# Patient Record
Sex: FEMALE | Race: WHITE | NOT HISPANIC OR LATINO | Employment: FULL TIME | URBAN - METROPOLITAN AREA
[De-identification: names, ages, dates, MRNs, and addresses within clinical notes are randomized per-mention and may not be internally consistent; named-entity substitution may affect disease eponyms.]

---

## 2017-02-17 ENCOUNTER — OFFICE VISIT (OUTPATIENT)
Dept: INTERNAL MEDICINE | Facility: CLINIC | Age: 52
End: 2017-02-17

## 2017-02-17 VITALS
TEMPERATURE: 98.5 F | HEIGHT: 71 IN | DIASTOLIC BLOOD PRESSURE: 72 MMHG | OXYGEN SATURATION: 98 % | SYSTOLIC BLOOD PRESSURE: 118 MMHG | HEART RATE: 62 BPM

## 2017-02-17 DIAGNOSIS — L25.9 CONTACT DERMATITIS, UNSPECIFIED CONTACT DERMATITIS TYPE, UNSPECIFIED TRIGGER: ICD-10-CM
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
## 2017-02-17 PROCEDURE — 99213 OFFICE O/P EST LOW 20 MIN: CPT | Performed by: NURSE PRACTITIONER

## 2017-02-17 RX ORDER — MULTIVITAMIN
1 TABLET ORAL DAILY
COMMUNITY
Start: 2013-05-10

## 2017-02-17 RX ORDER — SULFAMETHOXAZOLE AND TRIMETHOPRIM 800; 160 MG/1; MG/1
1 TABLET ORAL 2 TIMES DAILY
Qty: 10 TABLET | Refills: 0 | Status: SHIPPED | OUTPATIENT
Start: 2017-02-17 | End: 2017-02-22

## 2017-02-17 RX ORDER — METHYLPREDNISOLONE 4 MG/1
TABLET ORAL
Qty: 21 TABLET | Refills: 0 | Status: SHIPPED | OUTPATIENT
Start: 2017-02-17 | End: 2017-02-22

## 2017-02-17 NOTE — PROGRESS NOTES
Subjective   Magali Schrader is a 52 y.o. female who presents due to a rash.    HPI Comments: She was in [place] earlier this week when she noticed what she things is an insect sting on the left chin. She states the area has been pruritic but not painful, she c/o similar lesions throughout arms since then. She states the lesions have had some clear drainage. Denies fever/chills.    Rash   This is a new problem. The current episode started in the past 7 days. The problem has been gradually worsening since onset. The affected locations include the left arm, right arm, face and neck. The rash is characterized by itchiness and redness. She was exposed to an insect bite/sting. Pertinent negatives include no congestion, cough, facial edema, fatigue, fever, shortness of breath, sore throat or vomiting. Past treatments include anti-itch cream. The treatment provided no relief. There is no history of allergies.        The following portions of the patient's history were reviewed and updated as appropriate: allergies, current medications, past social history and problem list.    Past Medical History   Diagnosis Date   • Hyperlipidemia    • Sinusitis          Current Outpatient Prescriptions:   •  Multiple Vitamin (MULTI-DAY VITAMINS) tablet, Take  by mouth Daily., Disp: , Rfl:   •  hydrocortisone (ANUSOL-HC) 2.5 % rectal cream, Insert  into the rectum 2 (Two) Times a Day., Disp: 28.35 g, Rfl: 1  •  MethylPREDNISolone (MEDROL) 4 MG tablet, follow package directions, Disp: 21 tablet, Rfl: 0  •  sulfamethoxazole-trimethoprim (BACTRIM DS,SEPTRA DS) 800-160 MG per tablet, Take 1 tablet by mouth 2 (Two) Times a Day for 5 days., Disp: 10 tablet, Rfl: 0    No Known Allergies    Review of Systems   Constitutional: Negative for chills, fatigue, fever and unexpected weight change.   HENT: Negative for congestion, ear pain, postnasal drip, sinus pressure, sore throat and trouble swallowing.    Eyes: Negative for visual disturbance.  "  Respiratory: Negative for cough, chest tightness, shortness of breath and wheezing.    Cardiovascular: Negative for chest pain, palpitations and leg swelling.   Gastrointestinal: Negative for abdominal pain, blood in stool, nausea and vomiting.   Endocrine: Negative for cold intolerance and heat intolerance.   Genitourinary: Negative for dysuria, frequency and urgency.   Musculoskeletal: Negative for arthralgias and joint swelling.   Skin: Positive for rash. Negative for color change.   Allergic/Immunologic: Negative for immunocompromised state.   Neurological: Negative for syncope, weakness and headaches.   Hematological: Does not bruise/bleed easily.       Objective   Vitals:    02/17/17 0834   BP: 118/72   BP Location: Left arm   Patient Position: Sitting   Cuff Size: Adult   Pulse: 62   Temp: 98.5 °F (36.9 °C)   SpO2: 98%   Height: 71\" (180.3 cm)     Physical Exam   Constitutional: She is oriented to person, place, and time. She appears well-developed and well-nourished. No distress.   HENT:   Head: Normocephalic and atraumatic.   Right Ear: External ear normal.   Left Ear: External ear normal.   Eyes: Conjunctivae and EOM are normal. Pupils are equal, round, and reactive to light. No scleral icterus.   Neck: Normal range of motion. Neck supple. No thyromegaly present.   Cardiovascular: Normal rate, regular rhythm, normal heart sounds and intact distal pulses.  Exam reveals no gallop and no friction rub.    No murmur heard.  Pulmonary/Chest: Effort normal and breath sounds normal. No respiratory distress.   Lymphadenopathy:     She has no cervical adenopathy.   Neurological: She is alert and oriented to person, place, and time.   Skin: Skin is warm and dry. Rash noted. Rash is papular. No erythema.   Scattered papules with surrounding erythema on bilateral arms and anterior neck. She also has 3 lesions on her left chin and malar area.   Psychiatric: She has a normal mood and affect. Her behavior is normal. "   Nursing note and vitals reviewed.      Assessment/Plan   Magali was seen today for insect bite.    Diagnoses and all orders for this visit:    Insect bite, initial encounter  Comments:  will treat w/ Bactrim due to surrounding erythema & drainage, treat aggressively and prevent staph infection    Contact dermatitis, unspecified contact dermatitis type, unspecified trigger  -     sulfamethoxazole-trimethoprim (BACTRIM DS,SEPTRA DS) 800-160 MG per tablet; Take 1 tablet by mouth 2 (Two) Times a Day for 5 days.  -     MethylPREDNISolone (MEDROL) 4 MG tablet; follow package directions  -     hydrocortisone (ANUSOL-HC) 2.5 % rectal cream; Insert  into the rectum 2 (Two) Times a Day.    Advised to avoid Zyrtec and/or Benadryl while flying.

## 2017-02-22 ENCOUNTER — OFFICE VISIT (OUTPATIENT)
Dept: INTERNAL MEDICINE | Facility: CLINIC | Age: 52
End: 2017-02-22

## 2017-02-22 ENCOUNTER — APPOINTMENT (OUTPATIENT)
Dept: WOMENS IMAGING | Facility: HOSPITAL | Age: 52
End: 2017-02-22

## 2017-02-22 VITALS
HEIGHT: 71 IN | HEART RATE: 83 BPM | WEIGHT: 162 LBS | SYSTOLIC BLOOD PRESSURE: 118 MMHG | OXYGEN SATURATION: 97 % | BODY MASS INDEX: 22.68 KG/M2 | DIASTOLIC BLOOD PRESSURE: 74 MMHG

## 2017-02-22 DIAGNOSIS — Z11.59 SCREENING FOR VIRAL DISEASE: ICD-10-CM

## 2017-02-22 DIAGNOSIS — M25.561 ACUTE PAIN OF RIGHT KNEE: ICD-10-CM

## 2017-02-22 DIAGNOSIS — Z12.11 SCREENING FOR COLON CANCER: ICD-10-CM

## 2017-02-22 DIAGNOSIS — Z12.39 SCREENING FOR BREAST CANCER: ICD-10-CM

## 2017-02-22 DIAGNOSIS — Z00.00 PE (PHYSICAL EXAM), ANNUAL: Primary | ICD-10-CM

## 2017-02-22 LAB
ALBUMIN SERPL-MCNC: 4.28 G/DL (ref 3.4–4.6)
ALBUMIN/GLOB SERPL: 1.3 G/DL
ALP SERPL-CCNC: 55 U/L (ref 46–116)
ALT SERPL W P-5'-P-CCNC: 30 U/L (ref 14–59)
ANION GAP SERPL CALCULATED.3IONS-SCNC: 8 MMOL/L
AST SERPL-CCNC: 21 U/L (ref 7–37)
BASOPHILS # BLD AUTO: 0.06 10*3/MM3 (ref 0–0.2)
BASOPHILS NFR BLD AUTO: 1.1 % (ref 0–2)
BILIRUB SERPL-MCNC: 0.3 MG/DL (ref 0.2–1)
BILIRUB UR QL STRIP: NEGATIVE
BUN BLD-MCNC: 15 MG/DL (ref 6–22)
BUN/CREAT SERPL: 16 (ref 7–25)
CALCIUM SPEC-SCNC: 9.1 MG/DL (ref 8.6–10.5)
CHLORIDE SERPL-SCNC: 100 MMOL/L (ref 95–107)
CHOLEST SERPL-MCNC: 237 MG/DL (ref 0–200)
CLARITY UR: CLEAR
CO2 SERPL-SCNC: 30 MMOL/L (ref 23–32)
COLOR UR: YELLOW
CREAT BLD-MCNC: 0.94 MG/DL (ref 0.55–1.02)
DEPRECATED RDW RBC AUTO: 43.2 FL (ref 37–54)
EOSINOPHIL # BLD AUTO: 0.19 10*3/MM3 (ref 0–0.7)
EOSINOPHIL NFR BLD AUTO: 3.5 % (ref 0–5)
ERYTHROCYTE [DISTWIDTH] IN BLOOD BY AUTOMATED COUNT: 11.9 % (ref 11.5–15)
GFR SERPL CREATININE-BSD FRML MDRD: 63 ML/MIN/1.73
GLOBULIN UR ELPH-MCNC: 3.2 GM/DL
GLUCOSE BLD-MCNC: 95 MG/DL (ref 70–100)
GLUCOSE UR STRIP-MCNC: NEGATIVE MG/DL
HCT VFR BLD AUTO: 45 % (ref 34.1–44.9)
HDLC SERPL-MCNC: 104 MG/DL (ref 40–81)
HGB BLD-MCNC: 14.8 G/DL (ref 11.2–15.7)
HGB UR QL STRIP.AUTO: NEGATIVE
KETONES UR QL STRIP: NEGATIVE
LDLC SERPL CALC-MCNC: 118 MG/DL (ref 0–100)
LDLC/HDLC SERPL: 1.13 {RATIO}
LEUKOCYTE ESTERASE UR QL STRIP.AUTO: NEGATIVE
LYMPHOCYTES # BLD AUTO: 0.93 10*3/MM3 (ref 0.8–7)
LYMPHOCYTES NFR BLD AUTO: 17.3 % (ref 10–60)
MCH RBC QN AUTO: 32.7 PG (ref 26–34)
MCHC RBC AUTO-ENTMCNC: 32.9 G/DL (ref 31–37)
MCV RBC AUTO: 99.3 FL (ref 80–100)
MONOCYTES # BLD AUTO: 1.26 10*3/MM3 (ref 0–1)
MONOCYTES NFR BLD AUTO: 23.4 % (ref 0–13)
NEUTROPHILS # BLD AUTO: 2.95 10*3/MM3 (ref 1–11)
NEUTROPHILS NFR BLD AUTO: 54.7 % (ref 30–85)
NITRITE UR QL STRIP: NEGATIVE
PH UR STRIP.AUTO: 7 [PH] (ref 5–8)
PLAT MORPH BLD: NORMAL
PLATELET # BLD AUTO: 297 10*3/MM3 (ref 150–450)
PMV BLD AUTO: 9.3 FL (ref 6–12)
POTASSIUM BLD-SCNC: 5.2 MMOL/L (ref 3.3–5.3)
PROT SERPL-MCNC: 7.5 G/DL (ref 6.3–8.4)
PROT UR QL STRIP: NEGATIVE
RBC # BLD AUTO: 4.53 10*6/MM3 (ref 3.93–5.22)
RBC MORPH BLD: NORMAL
SODIUM BLD-SCNC: 138 MMOL/L (ref 136–145)
SP GR UR STRIP: 1.01 (ref 1–1.03)
TRIGL SERPL-MCNC: 76 MG/DL (ref 0–150)
TSH SERPL DL<=0.05 MIU/L-ACNC: 1.47 MIU/ML (ref 0.4–4.2)
UROBILINOGEN UR QL STRIP: NORMAL
VLDLC SERPL-MCNC: 15.2 MG/DL
WBC MORPH BLD: NORMAL
WBC NRBC COR # BLD: 5.39 10*3/MM3 (ref 5–10)

## 2017-02-22 PROCEDURE — 80061 LIPID PANEL: CPT | Performed by: NURSE PRACTITIONER

## 2017-02-22 PROCEDURE — 84443 ASSAY THYROID STIM HORMONE: CPT | Performed by: NURSE PRACTITIONER

## 2017-02-22 PROCEDURE — 80053 COMPREHEN METABOLIC PANEL: CPT | Performed by: NURSE PRACTITIONER

## 2017-02-22 PROCEDURE — 36415 COLL VENOUS BLD VENIPUNCTURE: CPT | Performed by: NURSE PRACTITIONER

## 2017-02-22 PROCEDURE — 81003 URINALYSIS AUTO W/O SCOPE: CPT | Performed by: NURSE PRACTITIONER

## 2017-02-22 PROCEDURE — 99396 PREV VISIT EST AGE 40-64: CPT | Performed by: NURSE PRACTITIONER

## 2017-02-22 PROCEDURE — 85025 COMPLETE CBC W/AUTO DIFF WBC: CPT | Performed by: NURSE PRACTITIONER

## 2017-02-23 ENCOUNTER — OFFICE VISIT (OUTPATIENT)
Dept: INTERNAL MEDICINE | Facility: CLINIC | Age: 52
End: 2017-02-23

## 2017-02-23 ENCOUNTER — APPOINTMENT (OUTPATIENT)
Dept: WOMENS IMAGING | Facility: HOSPITAL | Age: 52
End: 2017-02-23

## 2017-02-23 DIAGNOSIS — Z12.39 SCREENING FOR BREAST CANCER: ICD-10-CM

## 2017-02-23 PROCEDURE — 77067 SCR MAMMO BI INCL CAD: CPT | Performed by: NURSE PRACTITIONER

## 2017-02-23 PROCEDURE — 77067 SCR MAMMO BI INCL CAD: CPT | Performed by: RADIOLOGY

## 2017-02-24 NOTE — PROGRESS NOTES
Subjective   Magali Schrader is a 52 y.o. female who presents for a physical exam.    HPI Comments: She c/o medial pain in the right knee since October, denies acute injury but does long distance runs and marathons. Sx originally began after a long run and improved somewhat with rest and with physical therapy. However, she c/o worsening pain after running 12 miles last Friday. She denies numbness/tingling in lower extremities.  Her rash from last week is rapidly resolving, denies itching or irritation from lesions.    Knee Pain    There was no injury mechanism (although sx began after a long run). The pain is present in the right knee. The quality of the pain is described as aching. The pain is moderate. The pain has been intermittent since onset. Pertinent negatives include no inability to bear weight, numbness or tingling. The symptoms are aggravated by weight bearing. She has tried ice and rest for the symptoms. The treatment provided mild relief.        The following portions of the patient's history were reviewed and updated as appropriate: allergies, current medications, past social history and problem list.    Past Medical History   Diagnosis Date   • Hyperlipidemia    • Sinusitis          Current Outpatient Prescriptions:   •  Multiple Vitamin (MULTI-DAY VITAMINS) tablet, Take  by mouth Daily., Disp: , Rfl:     No Known Allergies    Review of Systems   Constitutional: Negative for activity change, appetite change, chills, diaphoresis, fatigue, fever and unexpected weight change.   HENT: Negative for congestion, dental problem, drooling, ear discharge, ear pain, facial swelling, hearing loss, mouth sores, nosebleeds, postnasal drip, rhinorrhea, sinus pressure, sore throat, tinnitus and trouble swallowing.         Wears eye glasses, last eye exam <1 year   Eyes: Negative for photophobia, pain, discharge, redness, itching and visual disturbance.   Respiratory: Negative for apnea, cough, choking, chest  "tightness, shortness of breath and wheezing.    Cardiovascular: Negative for chest pain, palpitations and leg swelling.        No orthopnea, PND, BOTELLO   Gastrointestinal: Negative for abdominal pain, blood in stool, constipation, diarrhea, nausea and vomiting.   Endocrine: Negative for cold intolerance, heat intolerance, polydipsia and polyuria.   Genitourinary: Negative for decreased urine volume, dysuria, enuresis, flank pain, frequency, hematuria and urgency.   Musculoskeletal: Positive for arthralgias. Negative for back pain, gait problem, joint swelling, myalgias, neck pain and neck stiffness.   Skin: Positive for rash. Negative for color change.        No hair changes, no nail changes   Allergic/Immunologic: Negative for environmental allergies, food allergies and immunocompromised state.   Neurological: Negative for dizziness, tingling, tremors, seizures, syncope, speech difficulty, weakness, light-headedness, numbness and headaches.   Hematological: Negative for adenopathy. Does not bruise/bleed easily.   Psychiatric/Behavioral: Negative for agitation, confusion, decreased concentration, dysphoric mood, sleep disturbance and suicidal ideas. The patient is not nervous/anxious.        Objective   Vitals:    02/22/17 0814   BP: 118/74   BP Location: Left arm   Patient Position: Sitting   Cuff Size: Adult   Pulse: 83   SpO2: 97%   Weight: 162 lb (73.5 kg)   Height: 71\" (180.3 cm)     Physical Exam   Constitutional: She is oriented to person, place, and time. She appears well-developed and well-nourished. No distress.   HENT:   Right Ear: Hearing, tympanic membrane, external ear and ear canal normal.   Left Ear: Hearing, tympanic membrane, external ear and ear canal normal.   Nose: Right sinus exhibits no maxillary sinus tenderness and no frontal sinus tenderness. Left sinus exhibits no maxillary sinus tenderness and no frontal sinus tenderness.   Eyes: Conjunctivae, EOM and lids are normal. Pupils are equal, " round, and reactive to light.   Neck: Trachea normal and phonation normal. Neck supple. Normal carotid pulses and no JVD present. Carotid bruit is not present. No thyroid mass and no thyromegaly present.   Cardiovascular: Normal rate, regular rhythm, S1 normal and S2 normal.  PMI is not displaced.  Exam reveals no gallop and no friction rub.    No murmur heard.  Pulses:       Carotid pulses are 2+ on the right side, and 2+ on the left side.       Radial pulses are 2+ on the right side, and 2+ on the left side.        Dorsalis pedis pulses are 2+ on the right side, and 2+ on the left side.   Pulmonary/Chest: Effort normal and breath sounds normal. She has no wheezes. She has no rhonchi. She has no rales. Chest wall is not dull to percussion.   Abdominal: Soft. Normal appearance, normal aorta and bowel sounds are normal. She exhibits no abdominal bruit and no mass. There is no hepatosplenomegaly. There is no tenderness.   Musculoskeletal: Normal range of motion. She exhibits no edema.        Right knee: She exhibits normal range of motion and no swelling. Tenderness found. Medial joint line tenderness noted.   Lymphadenopathy:     She has no cervical adenopathy.     She has no axillary adenopathy.        Left: No supraclavicular adenopathy present.   Neurological: She is alert and oriented to person, place, and time. She has normal strength and normal reflexes. No cranial nerve deficit or sensory deficit. Coordination normal.   Skin: Skin is warm and dry. No rash noted.   Macules on bilateral forearms without erythema   Psychiatric: She has a normal mood and affect. Her speech is normal and behavior is normal. Judgment and thought content normal. Cognition and memory are normal. She is attentive.   Nursing note and vitals reviewed.      Assessment/Plan   Magali was seen today for annual exam and knee pain.    Diagnoses and all orders for this visit:    PE (physical exam), annual  -     CBC Auto Differential;  Future  -     Comprehensive Metabolic Panel; Future  -     Lipid Panel; Future  -     TSH; Future  -     Urinalysis With / Microscopic If Indicated; Future  -     CBC Auto Differential  -     Comprehensive Metabolic Panel  -     Lipid Panel  -     TSH  -     Urinalysis With / Microscopic If Indicated  -     Scan Slide; Future  -     Scan Slide    Screening for colon cancer  Comments:  has not had colonoscopy which she will schedule  Orders:  -     Ambulatory Referral to General Surgery    Screening for breast cancer  Comments:  overdue for mammogram which she will schedule  Orders:  -     Mammo Screening Bilateral With CAD; Future    Acute pain of right knee  Comments:  due to lingering sx will refer to ortho for further eval, discussed resting and beginning NSAID for inflammation  Orders:  -     Ambulatory Referral to Orthopedic Surgery    Screening for viral disease

## 2017-02-25 LAB — HCV AB S/CO SERPL IA: <0.1 S/CO RATIO (ref 0–0.9)

## 2017-03-13 DIAGNOSIS — L25.9 CONTACT DERMATITIS, UNSPECIFIED CONTACT DERMATITIS TYPE, UNSPECIFIED TRIGGER: ICD-10-CM

## 2017-03-16 ENCOUNTER — TELEPHONE (OUTPATIENT)
Dept: INTERNAL MEDICINE | Facility: CLINIC | Age: 52
End: 2017-03-16

## 2017-03-16 ENCOUNTER — OFFICE VISIT (OUTPATIENT)
Dept: ORTHOPEDIC SURGERY | Facility: CLINIC | Age: 52
End: 2017-03-16

## 2017-03-16 VITALS — WEIGHT: 164 LBS | BODY MASS INDEX: 22.21 KG/M2 | HEIGHT: 72 IN

## 2017-03-16 DIAGNOSIS — G89.29 CHRONIC PAIN OF RIGHT KNEE: Primary | ICD-10-CM

## 2017-03-16 DIAGNOSIS — S83.241A OTHER TEAR OF MEDIAL MENISCUS OF RIGHT KNEE AS CURRENT INJURY, INITIAL ENCOUNTER: ICD-10-CM

## 2017-03-16 DIAGNOSIS — M25.561 CHRONIC PAIN OF RIGHT KNEE: Primary | ICD-10-CM

## 2017-03-16 PROCEDURE — 99203 OFFICE O/P NEW LOW 30 MIN: CPT | Performed by: ORTHOPAEDIC SURGERY

## 2017-03-16 PROCEDURE — 73562 X-RAY EXAM OF KNEE 3: CPT | Performed by: ORTHOPAEDIC SURGERY

## 2017-03-16 RX ORDER — SULFAMETHOXAZOLE AND TRIMETHOPRIM 800; 160 MG/1; MG/1
TABLET ORAL
Qty: 10 TABLET | Refills: 0 | OUTPATIENT
Start: 2017-03-16

## 2017-03-16 NOTE — PROGRESS NOTES
New Right Knee      Patient: Magali Shcrader        YOB: 1965    Medical Record Number: [id number]        Chief Complaints: Right Knee Pain  Chief Complaint   Patient presents with   • Right Knee - Establish Care           History of Present Illness: This is a  52 y.o. female who presents complaining of right knee pain.  It's been ongoing since December no history injury change in activity.  I saw her 3-4 years ago for completely another issue.  She is an avid runner and was vomiting of her mileage a little bit and started having pain and then swelling.  Her swelling is been significant she's been off since November still has persistent swelling she's tried anti-inflammatories rest with no improvement or symptoms.  Again she has swelling her symptoms are described as moderate constant burning swelling pain with sitting walking and running.  She is a polyp her past medical history is unremarkable    Allergies: No Known Allergies    Medications:   Home Medications:  Current Outpatient Prescriptions on File Prior to Visit   Medication Sig   • Multiple Vitamin (MULTI-DAY VITAMINS) tablet Take  by mouth Daily.     No current facility-administered medications on file prior to visit.      Current Medications:  Scheduled Meds:  Continuous Infusions:  No current facility-administered medications for this visit.   PRN Meds:.    Past Medical History   Diagnosis Date   • Hyperlipidemia    • Sinusitis         Past Surgical History   Procedure Laterality Date   •  section  2003        Social History     Occupational History   • Not on file.     Social History Main Topics   • Smoking status: Never Smoker   • Smokeless tobacco: Not on file   • Alcohol use Yes      Comment: 3 x weekly   • Drug use: No   • Sexual activity: Not on file    Social History     Social History Narrative        Family History   Problem Relation Age of Onset   • Alzheimer's disease Mother              Review of Systems: 14 point  "review of systems are remarkable for pertinent positives listed in the chart by the patient the remainder are negative    Review of Systems      Physical Exam: 52 y.o. female  General Appearance:    Alert, cooperative, in no acute distress                 Vitals:    03/16/17 1321   Weight: 164 lb (74.4 kg)   Height: 73\" (185.4 cm)      Patient is alert and read ×3 no acute distress appears her above-listed at height weight and age.  Affect is normal respiratory rate is normal unlabored. Heart rate regular rate rhythm, sclera, dentition and hearing are normal for the purpose of this exam.        Ortho Exam Physical exam of the right  knee reveals moderate effusion no redness.  The patient does have tenderness about the medial l joint line.  No tenderness about the lateral l joint line.  A negative bounce home and a positive l medial Alexandra.    Patient has a stable ligamentous exam.  The patient has a negative Lachman and negative anterior drawer and a negative pivot shift.  Quads are reasonable and symmetric bilaterally.  Calf is soft and nontender.  There is no overlying skin changes no lymphedema lymphadenopathy.  Patient has good hip range of motion full symmetric and asymptomatic and a normal ankle exam.  She has good distal pulses and sensation distally is intact        Procedures             Radiology:   AP, Lateral and merchant views of the right knee  were ordered/reviewed to evauateknee pain.  I've no comparative films.  These are essentially normal may be mild patellofemoral away otherwise no acute bony pathology is seen      Assessment/Plan:  Right knee pain I'm quite confident this lady has a medial meniscus tear.  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  Lantus to proceed with an MRI and have her call me after that test.                                  "

## 2017-03-16 NOTE — TELEPHONE ENCOUNTER
----- Message from Viry Cantu sent at 3/16/2017  8:14 AM EDT -----  Contact: pt - Dr Pugh' pt - RE: Rx refill  Pt returning call informing pt did not ask for refill on Rx. Pt informs that credit cards were compromised last month. Pt not sure if this had something do to with that. Pt assures did not call pharmacy for refill. Pt informs does not need refill on Rx. Could you please cancel order. Please advise. Thanks      Pt #[phone]

## 2017-03-21 ENCOUNTER — OFFICE VISIT (OUTPATIENT)
Dept: ORTHOPEDIC SURGERY | Facility: CLINIC | Age: 52
End: 2017-03-21

## 2017-03-21 DIAGNOSIS — S83.241A OTHER TEAR OF MEDIAL MENISCUS OF RIGHT KNEE AS CURRENT INJURY, INITIAL ENCOUNTER: ICD-10-CM

## 2017-03-21 DIAGNOSIS — G89.29 CHRONIC PAIN OF RIGHT KNEE: ICD-10-CM

## 2017-03-21 DIAGNOSIS — M25.561 CHRONIC PAIN OF RIGHT KNEE: ICD-10-CM

## 2017-03-21 PROCEDURE — 73721 MRI JNT OF LWR EXTRE W/O DYE: CPT | Performed by: ORTHOPAEDIC SURGERY

## 2017-03-24 ENCOUNTER — TELEPHONE (OUTPATIENT)
Dept: ORTHOPEDIC SURGERY | Facility: CLINIC | Age: 52
End: 2017-03-24

## 2017-03-24 DIAGNOSIS — S83.241D OTHER TEAR OF MEDIAL MENISCUS OF RIGHT KNEE AS CURRENT INJURY, SUBSEQUENT ENCOUNTER: Primary | ICD-10-CM

## 2017-03-24 NOTE — TELEPHONE ENCOUNTER
Called patient and let her know the results of her MRI per [facility]. (Patient has a Medial Meniscus Tear and [facility] states she would need a scope done)  MRI paper has been scanned into

## 2017-04-13 ENCOUNTER — OFFICE VISIT (OUTPATIENT)
Dept: ORTHOPEDIC SURGERY | Facility: CLINIC | Age: 52
End: 2017-04-13

## 2017-04-13 VITALS — HEIGHT: 72 IN | TEMPERATURE: 98.3 F | BODY MASS INDEX: 21.81 KG/M2 | WEIGHT: 161 LBS

## 2017-04-13 DIAGNOSIS — S83.241D OTHER TEAR OF MEDIAL MENISCUS OF RIGHT KNEE AS CURRENT INJURY, SUBSEQUENT ENCOUNTER: Primary | ICD-10-CM

## 2017-04-13 PROCEDURE — 99213 OFFICE O/P EST LOW 20 MIN: CPT | Performed by: ORTHOPAEDIC SURGERY

## 2017-04-13 NOTE — PROGRESS NOTES
History & Physical       Patient: Magali Schrader    Date of Admission: No admission date for patient encounter.    YOB: 1965    Medical Record Number: [id number]    Attending Physician: No att. providers found        Chief Complaints: There are no admission diagnoses documented for this encoun*   Chief Complaint   Patient presents with   • Right Knee - Follow-up, Pain, Edema           History of Present Illness: This patient was here for preop discussion of her right knee arthroscopy however she states her knee is feeling better she wants to prove that she really needs it.  Had a very long discussion with she and her  to the tune of about 40 minutes with very good questions from both of them regarding the meniscus itself the actual geometry of the tear I did review the MRI with him and discussed the natural history meniscus tears.  She states it is not hurting her now.  She is can I get back to her activities if she has return of her swelling her pain she will call me we will get her back on the schedule.  I'm fairly confident that this will happen so I did go on and give her a good idea on perioperative regimen what to expect wrist benefits alternatives etc.     Allergies: No Known Allergies    Medications:   Home Medications:  Current Outpatient Prescriptions on File Prior to Visit   Medication Sig   • Multiple Vitamin (MULTI-DAY VITAMINS) tablet Take  by mouth Daily.     No current facility-administered medications on file prior to visit.      Current Medications:  Scheduled Meds:  Continuous Infusions:  No current facility-administered medications for this visit.   PRN Meds:.    Past Medical History:   Diagnosis Date   • Hyperlipidemia    • Sinusitis         Past Surgical History:   Procedure Laterality Date   •  SECTION  2003        Social History     Occupational History   • Not on file.     Social History Main Topics   • Smoking status: Never Smoker   • Smokeless  "tobacco: Not on file   • Alcohol use Yes      Comment: 3 x weekly   • Drug use: No   • Sexual activity: Defer    Social History     Social History Narrative        Family History   Problem Relation Age of Onset   • Alzheimer's disease Mother        Review of Systems      Physical Exam: 52 y.o. female  General Appearance:    Alert, cooperative, in no acute distress                    Vitals:    04/13/17 0842   Temp: 98.3 °F (36.8 °C)   Weight: 161 lb (73 kg)   Height: 73\" (185.4 cm)        Head:    Normocephalic, without obvious abnormality, atraumatic   Eyes:            conjunctivae and sclerae normal, no pallor, corneas clear,    Ears:    Ears appear intact with no abnormalities noted   Throat:   No oral lesions, no thrush, oral mucosa moist   Neck:   No adenopathy, supple, trachea midline, no thyromegaly,    Back:     No kyphosis present, no scoliosis present, no skin lesions,      erythema or scars, no tenderness to percussion or                   palpation,   range of motion normal   Lungs:     Clear to auscultation,respirations regular, even and                  unlabored    Heart:    Regular rhythm and normal rate               Chest Wall:    No abnormalities observed   Abdomen:     Normal bowel sounds, no masses, no organomegaly, soft        non-tender, non-distended, no guarding, no rebound                tenderness   Rectal:     Deferred   Extremities:    Moves all extremities well, no edema,   no cyanosis, no redness   Pulses:   Pulses palpable and equal bilaterally   Skin:   No bleeding, bruising or rash   Lymph nodes:   No palpable adenopathy   Neurologic:   Appears neurologic intact             Assessment:  Patient Active Problem List   Diagnosis   • Hyperlipidemia           Plan: All risks, benefits and alternatives were discussed.  Risks including to but not exclusive to anesthetic complications, including death, MI, CVA, infection, bleeding DVT, PE,  fracture, residual pain and need for future " surgery.  Patient understood all and agrees to proceed.  Again, she wants to hold off on this now she's can do a little more running and see if her symptoms return if they do we will get her back on the schedule for knee arthroscopy

## 2017-05-05 ENCOUNTER — APPOINTMENT (OUTPATIENT)
Dept: PREADMISSION TESTING | Facility: HOSPITAL | Age: 52
End: 2017-05-05

## 2017-05-05 VITALS
SYSTOLIC BLOOD PRESSURE: 116 MMHG | HEART RATE: 45 BPM | OXYGEN SATURATION: 100 % | HEIGHT: 72 IN | WEIGHT: 155 LBS | BODY MASS INDEX: 20.99 KG/M2 | TEMPERATURE: 98.4 F | DIASTOLIC BLOOD PRESSURE: 73 MMHG

## 2017-05-05 LAB
DEPRECATED RDW RBC AUTO: 47.1 FL (ref 37–54)
ERYTHROCYTE [DISTWIDTH] IN BLOOD BY AUTOMATED COUNT: 13 % (ref 11.7–13)
HCT VFR BLD AUTO: 42.5 % (ref 35.6–45.5)
HGB BLD-MCNC: 14.2 G/DL (ref 11.9–15.5)
MCH RBC QN AUTO: 32.8 PG (ref 26.9–32)
MCHC RBC AUTO-ENTMCNC: 33.4 G/DL (ref 32.4–36.3)
MCV RBC AUTO: 98.2 FL (ref 80.5–98.2)
PLATELET # BLD AUTO: 307 10*3/MM3 (ref 140–500)
PMV BLD AUTO: 9.5 FL (ref 6–12)
RBC # BLD AUTO: 4.33 10*6/MM3 (ref 3.9–5.2)
WBC NRBC COR # BLD: 5.83 10*3/MM3 (ref 4.5–10.7)

## 2017-05-05 PROCEDURE — 85027 COMPLETE CBC AUTOMATED: CPT | Performed by: ORTHOPAEDIC SURGERY

## 2017-05-05 PROCEDURE — 36415 COLL VENOUS BLD VENIPUNCTURE: CPT

## 2017-05-08 ENCOUNTER — APPOINTMENT (OUTPATIENT)
Dept: PREADMISSION TESTING | Facility: HOSPITAL | Age: 52
End: 2017-05-08

## 2017-05-16 ENCOUNTER — ANESTHESIA EVENT (OUTPATIENT)
Dept: PERIOP | Facility: HOSPITAL | Age: 52
End: 2017-05-16

## 2017-05-16 ENCOUNTER — ANESTHESIA (OUTPATIENT)
Dept: PERIOP | Facility: HOSPITAL | Age: 52
End: 2017-05-16

## 2017-05-16 ENCOUNTER — HOSPITAL ENCOUNTER (OUTPATIENT)
Facility: HOSPITAL | Age: 52
Setting detail: HOSPITAL OUTPATIENT SURGERY
Discharge: HOME | End: 2017-05-16
Attending: ORTHOPAEDIC SURGERY | Admitting: ORTHOPAEDIC SURGERY

## 2017-05-16 VITALS
RESPIRATION RATE: 16 BRPM | DIASTOLIC BLOOD PRESSURE: 69 MMHG | HEART RATE: 54 BPM | TEMPERATURE: 98.2 F | OXYGEN SATURATION: 97 % | SYSTOLIC BLOOD PRESSURE: 108 MMHG

## 2017-05-16 DIAGNOSIS — Z98.890 STATUS POST ARTHROSCOPY OF KNEE: Primary | ICD-10-CM

## 2017-05-16 LAB
B-HCG UR QL: NEGATIVE
INTERNAL NEGATIVE CONTROL: NEGATIVE
INTERNAL POSITIVE CONTROL: POSITIVE
Lab: NORMAL

## 2017-05-16 PROCEDURE — 25010000002 MIDAZOLAM PER 1 MG: Performed by: ANESTHESIOLOGY

## 2017-05-16 PROCEDURE — 25010000002 KETOROLAC TROMETHAMINE PER 15 MG: Performed by: NURSE ANESTHETIST, CERTIFIED REGISTERED

## 2017-05-16 PROCEDURE — 25010000002 METHYLPREDNISOLONE PER 80 MG: Performed by: ORTHOPAEDIC SURGERY

## 2017-05-16 PROCEDURE — 25010000002 FENTANYL CITRATE (PF) 100 MCG/2ML SOLUTION: Performed by: NURSE ANESTHETIST, CERTIFIED REGISTERED

## 2017-05-16 PROCEDURE — 25010000002 PROPOFOL 10 MG/ML EMULSION: Performed by: NURSE ANESTHETIST, CERTIFIED REGISTERED

## 2017-05-16 PROCEDURE — 29881 ARTHRS KNE SRG MNISECTMY M/L: CPT | Performed by: ORTHOPAEDIC SURGERY

## 2017-05-16 PROCEDURE — 25010000002 MIDAZOLAM PER 1 MG: Performed by: NURSE ANESTHETIST, CERTIFIED REGISTERED

## 2017-05-16 PROCEDURE — 25010000003 CEFAZOLIN IN DEXTROSE 2-4 GM/100ML-% SOLUTION: Performed by: ORTHOPAEDIC SURGERY

## 2017-05-16 RX ORDER — LIDOCAINE HYDROCHLORIDE 20 MG/ML
INJECTION, SOLUTION INFILTRATION; PERINEURAL AS NEEDED
Status: DISCONTINUED | OUTPATIENT
Start: 2017-05-16 | End: 2017-05-16 | Stop reason: SURG

## 2017-05-16 RX ORDER — CEFAZOLIN SODIUM 2 G/100ML
2 INJECTION, SOLUTION INTRAVENOUS ONCE
Status: COMPLETED | OUTPATIENT
Start: 2017-05-16 | End: 2017-05-16

## 2017-05-16 RX ORDER — FENTANYL CITRATE 50 UG/ML
100 INJECTION, SOLUTION INTRAMUSCULAR; INTRAVENOUS
Status: DISCONTINUED | OUTPATIENT
Start: 2017-05-16 | End: 2017-05-16 | Stop reason: HOSPADM

## 2017-05-16 RX ORDER — HYDROMORPHONE HYDROCHLORIDE 1 MG/ML
0.5 INJECTION, SOLUTION INTRAMUSCULAR; INTRAVENOUS; SUBCUTANEOUS
Status: DISCONTINUED | OUTPATIENT
Start: 2017-05-16 | End: 2017-05-16 | Stop reason: HOSPADM

## 2017-05-16 RX ORDER — BUPIVACAINE HYDROCHLORIDE AND EPINEPHRINE 2.5; 5 MG/ML; UG/ML
INJECTION, SOLUTION INFILTRATION; PERINEURAL AS NEEDED
Status: DISCONTINUED | OUTPATIENT
Start: 2017-05-16 | End: 2017-05-16 | Stop reason: HOSPADM

## 2017-05-16 RX ORDER — ONDANSETRON 2 MG/ML
4 INJECTION INTRAMUSCULAR; INTRAVENOUS ONCE AS NEEDED
Status: DISCONTINUED | OUTPATIENT
Start: 2017-05-16 | End: 2017-05-16 | Stop reason: HOSPADM

## 2017-05-16 RX ORDER — KETOROLAC TROMETHAMINE 30 MG/ML
INJECTION, SOLUTION INTRAMUSCULAR; INTRAVENOUS AS NEEDED
Status: DISCONTINUED | OUTPATIENT
Start: 2017-05-16 | End: 2017-05-16 | Stop reason: SURG

## 2017-05-16 RX ORDER — HYDROCODONE BITARTRATE AND ACETAMINOPHEN 7.5; 325 MG/1; MG/1
1 TABLET ORAL ONCE AS NEEDED
Status: COMPLETED | OUTPATIENT
Start: 2017-05-16 | End: 2017-05-16

## 2017-05-16 RX ORDER — SODIUM CHLORIDE, SODIUM LACTATE, POTASSIUM CHLORIDE, AND CALCIUM CHLORIDE .6; .31; .03; .02 G/100ML; G/100ML; G/100ML; G/100ML
IRRIGANT IRRIGATION AS NEEDED
Status: DISCONTINUED | OUTPATIENT
Start: 2017-05-16 | End: 2017-05-16 | Stop reason: HOSPADM

## 2017-05-16 RX ORDER — SODIUM CHLORIDE, SODIUM LACTATE, POTASSIUM CHLORIDE, CALCIUM CHLORIDE 600; 310; 30; 20 MG/100ML; MG/100ML; MG/100ML; MG/100ML
9 INJECTION, SOLUTION INTRAVENOUS CONTINUOUS
Status: DISCONTINUED | OUTPATIENT
Start: 2017-05-16 | End: 2017-05-16 | Stop reason: HOSPADM

## 2017-05-16 RX ORDER — FENTANYL CITRATE 50 UG/ML
50 INJECTION, SOLUTION INTRAMUSCULAR; INTRAVENOUS
Status: DISCONTINUED | OUTPATIENT
Start: 2017-05-16 | End: 2017-05-16 | Stop reason: HOSPADM

## 2017-05-16 RX ORDER — PROMETHAZINE HYDROCHLORIDE 25 MG/ML
12.5 INJECTION, SOLUTION INTRAMUSCULAR; INTRAVENOUS ONCE AS NEEDED
Status: DISCONTINUED | OUTPATIENT
Start: 2017-05-16 | End: 2017-05-16 | Stop reason: HOSPADM

## 2017-05-16 RX ORDER — FENTANYL CITRATE 50 UG/ML
INJECTION, SOLUTION INTRAMUSCULAR; INTRAVENOUS AS NEEDED
Status: DISCONTINUED | OUTPATIENT
Start: 2017-05-16 | End: 2017-05-16 | Stop reason: SURG

## 2017-05-16 RX ORDER — FLUMAZENIL 0.1 MG/ML
0.2 INJECTION INTRAVENOUS AS NEEDED
Status: DISCONTINUED | OUTPATIENT
Start: 2017-05-16 | End: 2017-05-16 | Stop reason: HOSPADM

## 2017-05-16 RX ORDER — SODIUM CHLORIDE 0.9 % (FLUSH) 0.9 %
1-10 SYRINGE (ML) INJECTION AS NEEDED
Status: DISCONTINUED | OUTPATIENT
Start: 2017-05-16 | End: 2017-05-16 | Stop reason: HOSPADM

## 2017-05-16 RX ORDER — METHYLPREDNISOLONE ACETATE 80 MG/ML
INJECTION, SUSPENSION INTRA-ARTICULAR; INTRALESIONAL; INTRAMUSCULAR; SOFT TISSUE AS NEEDED
Status: DISCONTINUED | OUTPATIENT
Start: 2017-05-16 | End: 2017-05-16 | Stop reason: HOSPADM

## 2017-05-16 RX ORDER — FAMOTIDINE 10 MG/ML
20 INJECTION, SOLUTION INTRAVENOUS ONCE
Status: COMPLETED | OUTPATIENT
Start: 2017-05-16 | End: 2017-05-16

## 2017-05-16 RX ORDER — MIDAZOLAM HYDROCHLORIDE 1 MG/ML
1 INJECTION INTRAMUSCULAR; INTRAVENOUS
Status: DISCONTINUED | OUTPATIENT
Start: 2017-05-16 | End: 2017-05-16 | Stop reason: HOSPADM

## 2017-05-16 RX ORDER — MIDAZOLAM HYDROCHLORIDE 1 MG/ML
2 INJECTION INTRAMUSCULAR; INTRAVENOUS
Status: DISCONTINUED | OUTPATIENT
Start: 2017-05-16 | End: 2017-05-16 | Stop reason: HOSPADM

## 2017-05-16 RX ORDER — HYDRALAZINE HYDROCHLORIDE 20 MG/ML
5 INJECTION INTRAMUSCULAR; INTRAVENOUS
Status: DISCONTINUED | OUTPATIENT
Start: 2017-05-16 | End: 2017-05-16 | Stop reason: HOSPADM

## 2017-05-16 RX ORDER — NALOXONE HCL 0.4 MG/ML
0.2 VIAL (ML) INJECTION AS NEEDED
Status: DISCONTINUED | OUTPATIENT
Start: 2017-05-16 | End: 2017-05-16 | Stop reason: HOSPADM

## 2017-05-16 RX ORDER — OXYCODONE AND ACETAMINOPHEN 7.5; 325 MG/1; MG/1
1 TABLET ORAL ONCE AS NEEDED
Status: DISCONTINUED | OUTPATIENT
Start: 2017-05-16 | End: 2017-05-16 | Stop reason: HOSPADM

## 2017-05-16 RX ORDER — PROMETHAZINE HYDROCHLORIDE 25 MG/1
12.5 TABLET ORAL ONCE AS NEEDED
Status: DISCONTINUED | OUTPATIENT
Start: 2017-05-16 | End: 2017-05-16 | Stop reason: HOSPADM

## 2017-05-16 RX ORDER — HYDROCODONE BITARTRATE AND ACETAMINOPHEN 5; 325 MG/1; MG/1
1 TABLET ORAL EVERY 4 HOURS PRN
Qty: 50 TABLET | Refills: 0 | Status: SHIPPED | OUTPATIENT
Start: 2017-05-16 | End: 2017-05-26

## 2017-05-16 RX ORDER — LABETALOL HYDROCHLORIDE 5 MG/ML
5 INJECTION, SOLUTION INTRAVENOUS
Status: DISCONTINUED | OUTPATIENT
Start: 2017-05-16 | End: 2017-05-16 | Stop reason: HOSPADM

## 2017-05-16 RX ORDER — PROMETHAZINE HYDROCHLORIDE 25 MG/1
25 TABLET ORAL ONCE AS NEEDED
Status: DISCONTINUED | OUTPATIENT
Start: 2017-05-16 | End: 2017-05-16 | Stop reason: HOSPADM

## 2017-05-16 RX ORDER — PROMETHAZINE HYDROCHLORIDE 25 MG/1
25 SUPPOSITORY RECTAL ONCE AS NEEDED
Status: DISCONTINUED | OUTPATIENT
Start: 2017-05-16 | End: 2017-05-16 | Stop reason: HOSPADM

## 2017-05-16 RX ORDER — PROPOFOL 10 MG/ML
VIAL (ML) INTRAVENOUS AS NEEDED
Status: DISCONTINUED | OUTPATIENT
Start: 2017-05-16 | End: 2017-05-16 | Stop reason: SURG

## 2017-05-16 RX ORDER — DIPHENHYDRAMINE HYDROCHLORIDE 50 MG/ML
12.5 INJECTION INTRAMUSCULAR; INTRAVENOUS
Status: DISCONTINUED | OUTPATIENT
Start: 2017-05-16 | End: 2017-05-16 | Stop reason: HOSPADM

## 2017-05-16 RX ORDER — MIDAZOLAM HYDROCHLORIDE 1 MG/ML
INJECTION INTRAMUSCULAR; INTRAVENOUS AS NEEDED
Status: DISCONTINUED | OUTPATIENT
Start: 2017-05-16 | End: 2017-05-16 | Stop reason: SURG

## 2017-05-16 RX ADMIN — SODIUM CHLORIDE, POTASSIUM CHLORIDE, SODIUM LACTATE AND CALCIUM CHLORIDE 9 ML/HR: 600; 310; 30; 20 INJECTION, SOLUTION INTRAVENOUS at 08:35

## 2017-05-16 RX ADMIN — FENTANYL CITRATE 50 MCG: 50 INJECTION INTRAMUSCULAR; INTRAVENOUS at 09:48

## 2017-05-16 RX ADMIN — LIDOCAINE HYDROCHLORIDE 60 MG: 20 INJECTION, SOLUTION INFILTRATION; PERINEURAL at 09:48

## 2017-05-16 RX ADMIN — HYDROCODONE BITARTRATE AND ACETAMINOPHEN 1 TABLET: 7.5; 325 TABLET ORAL at 11:07

## 2017-05-16 RX ADMIN — PROPOFOL 200 MG: 10 INJECTION, EMULSION INTRAVENOUS at 09:48

## 2017-05-16 RX ADMIN — FAMOTIDINE 20 MG: 10 INJECTION, SOLUTION INTRAVENOUS at 09:32

## 2017-05-16 RX ADMIN — MIDAZOLAM HYDROCHLORIDE 1 MG: 1 INJECTION, SOLUTION INTRAMUSCULAR; INTRAVENOUS at 09:47

## 2017-05-16 RX ADMIN — KETOROLAC TROMETHAMINE 30 MG: 30 INJECTION, SOLUTION INTRAMUSCULAR; INTRAVENOUS at 10:23

## 2017-05-16 RX ADMIN — CEFAZOLIN SODIUM 2 G: 2 INJECTION, SOLUTION INTRAVENOUS at 09:45

## 2017-05-16 RX ADMIN — SODIUM CHLORIDE, POTASSIUM CHLORIDE, SODIUM LACTATE AND CALCIUM CHLORIDE: 600; 310; 30; 20 INJECTION, SOLUTION INTRAVENOUS at 09:47

## 2017-05-16 RX ADMIN — MIDAZOLAM 2 MG: 1 INJECTION INTRAMUSCULAR; INTRAVENOUS at 09:32

## 2017-05-16 RX ADMIN — FENTANYL CITRATE 50 MCG: 50 INJECTION INTRAMUSCULAR; INTRAVENOUS at 10:09

## 2017-05-26 ENCOUNTER — OFFICE VISIT (OUTPATIENT)
Dept: ORTHOPEDIC SURGERY | Facility: CLINIC | Age: 52
End: 2017-05-26

## 2017-05-26 VITALS — TEMPERATURE: 98.1 F | HEIGHT: 72 IN | WEIGHT: 154 LBS | BODY MASS INDEX: 20.86 KG/M2

## 2017-05-26 DIAGNOSIS — Z98.890 S/P ARTHROSCOPY OF KNEE: Primary | ICD-10-CM

## 2017-05-26 PROCEDURE — 99024 POSTOP FOLLOW-UP VISIT: CPT | Performed by: ORTHOPAEDIC SURGERY

## 2017-05-31 ENCOUNTER — TELEPHONE (OUTPATIENT)
Dept: ORTHOPEDIC SURGERY | Facility: CLINIC | Age: 52
End: 2017-05-31

## 2017-06-05 ENCOUNTER — TREATMENT (OUTPATIENT)
Dept: PHYSICAL THERAPY | Facility: CLINIC | Age: 52
End: 2017-06-05

## 2017-06-05 DIAGNOSIS — Z98.890 S/P ARTHROSCOPY OF RIGHT KNEE: Primary | ICD-10-CM

## 2017-06-05 PROCEDURE — 97161 PT EVAL LOW COMPLEX 20 MIN: CPT | Performed by: PHYSICAL THERAPIST

## 2017-06-05 PROCEDURE — 97535 SELF CARE MNGMENT TRAINING: CPT | Performed by: PHYSICAL THERAPIST

## 2017-06-05 PROCEDURE — 97110 THERAPEUTIC EXERCISES: CPT | Performed by: PHYSICAL THERAPIST

## 2017-06-05 NOTE — PROGRESS NOTES
Physical Therapy Initial Evaluation and Plan of Care    TIME IN 1438 TIME OUT 1523  Patient: Magali Schrader   : 1965  Diagnosis/ICD-10 Code:  S/P arthroscopy of right knee [Z98.890]  Referring practitioner: Talia Niño MD    Subjective Evaluation    History of Present Illness  Date of onset: 2017  Date of surgery: 2017  Mechanism of injury: Pt reports running regularly and increased knee pain during running and kept running.  Process of multiple physical therapists and MD appointment began in January of this year.  Pt reports attempting physical therapy prior to follow up with MD and MRI positive meniscus tear.  Pt underwent right partial menisectomy on 17.      Pt reports getting back into the pool and getting back on bike this weekend which felt good and pt sees progress with increased activity.    Pain  Current pain ratin  At best pain ratin  At worst pain ratin  Location: medial right knee  Quality: quick sharp.  Relieving factors: rest and ice  Exacerbated by: walking too far.    Social Support  Patient lives at: stairs in home, but no difficulty.    Diagnostic Tests  MRI studies: abnormal (prior to surgery)    Treatments  Previous treatment: physical therapy  Patient Goals  Patient goals for therapy: increased strength, return to sport/leisure activities and decreased pain  Patient goal: Short-Term Goals - In 2 weeks:  Pt will be (I) with initial HEP.  Right knee AROM improve to +3 degrees hyperextension to 140 degrees flexion for improved stability in stance phase and ability to squat.  Pt will perform 6 inch step down 5 times repetitively without pain and with level pelvis.      Long-Term Goals - In 4 weeks:  Right hip flexion, abduction, extension and knee flexion and extension strength improve to 5-/5.  Pt will perform single-leg squat 5 times consecutively with level pelvis, negative anterior translation of tibia and without pain to demonstrate improved strength  "for athletic activities.  Pt will return to running >/= 5 miles without knee pain or swelling.  LEFS score improve to 59/80 to demonstrate functional improvement.           Objective     Observations     Additional Observation Details  Incision sites covered with steri-strips and demonstrates no signs of infection      Active Range of Motion   Left Knee   Flexion: 145 degrees   Extension: Left knee active extension: +3 degrees hyperextension.     Right Knee   Flexion: 130 degrees   Extension: 0 degrees     Patellar Mobility   Left Knee Patellar tendons within functional limits include the medial, lateral, superior and inferior.     Right Knee Hypomobile in the medial, lateral, superior and inferior patellar tendon(s).     Strength/Myotome Testing     Right Hip   Planes of Motion   Flexion: 4-  Extension: 4+ (4-/5 with knee flexed)  Abduction: 4  External rotation: 4 (increased medial knee pain)  Internal rotation: 4    Isolated Muscles   Gluteus maximums: 4-    Right Knee   Extension: 5    Right Ankle/Foot   Plantar flexion: 4 (20 unilateral heel raises)    Ambulation     Observational Gait   Left stance time, right stance time, left step length and right step length within functional limits.     Functional Assessment   Squat   Left tibial anterior translation beyond toes and right tibial anterior translation beyond toes. No pain, no trunk lean left, no left valgus, no left varus, no right valgus and no right varus.     Forward Step Down 6\"   Left Leg  Positive Trendelenburg.     Right Leg  Positive Trendelenburg, increased forward trunk lean and valgus.     Single Leg Stance   Left: 20 seconds  Right: 20 (minimum - stopped pt at 20 sec) seconds         Assessment & Plan     Assessment  Impairments: abnormal or restricted ROM, impaired physical strength, lacks appropriate home exercise program and pain with function  Assessment details: Pt is pleasant and active 52 y.o. Female who presents s/p right knee " arthroscopy.  Pt demonstrates decreased right knee ROM, strength and neuromuscular control.  Pt continues to have pain during walking longer distances and is unable to participate in hobbies including running due to recent post-operative status and associated impairments.  Pt requires skilled physical therapy to address impairments and return to prior level of function.   Prognosis: good    Goals  Short-Term Goals - In 2 weeks:  Pt will be (I) with initial HEP.  Right knee AROM improve to +3 degrees hyperextension to 140 degrees flexion for improved stability in stance phase and ability to squat.  Pt will perform 6 inch step down 5 times repetitively without pain and with level pelvis.      Long-Term Goals - In 4 weeks:  Right hip flexion, abduction, extension and knee flexion and extension strength improve to 5-/5.  Pt will perform single-leg squat 5 times consecutively with level pelvis, negative anterior translation of tibia and without pain to demonstrate improved strength for athletic activities.  Pt will return to running >/= 5 miles without knee pain or swelling.  LEFS score improve to 59/80 to demonstrate functional improvement.    Plan  Therapy options: will be seen for skilled physical therapy services  Planned modality interventions: electrical stimulation/Russian stimulation, TENS, cryotherapy and thermotherapy (hydrocollator packs)  Planned therapy interventions: abdominal trunk stabilization, functional ROM exercises, body mechanics training, balance/weight-bearing training, home exercise program, joint mobilization, manual therapy, neuromuscular re-education, soft tissue mobilization, strengthening, stretching and therapeutic activities  Frequency: 2x week  Duration in weeks: 4  Treatment plan discussed with: patient        Manual Therapy:    -     mins  46532;  Therapeutic Exercise:    20     mins  08461;     Neuromuscular Jazmin:    -    mins  78892;    Therapeutic Activity:     -     mins  54900;      Gait Training:      -     mins  60912;     Ultrasound:     -     mins  98794;    Electrical Stimulation:    -     mins  54368 ( );  Dry Needling     -     mins self-pay  Self care home mngmt  8 mins [id number]    Timed Treatment:   28   mins   Total Treatment:     45   mins    PT SIGNATURE: Yana Anthony, PT, DPT   DATE TREATMENT INITIATED: 6/5/2017    Initial Certification  Certification Period: 9/3/2017  I certify that the therapy services are furnished while this patient is under my care.  The services outlined above are required by this patient, and will be reviewed every 90 days.     PHYSICIAN: Talia Niño MD      DATE:     Please sign and return via fax to [phone]. Thank you, [facility].

## 2017-06-06 NOTE — PATIENT INSTRUCTIONS
HEP performance  Purpose of treatment  Pathology and involved anatomy  Please view [facility] Magali Schrader for a complete list of HEP instructions.

## 2017-06-08 ENCOUNTER — TREATMENT (OUTPATIENT)
Dept: PHYSICAL THERAPY | Facility: CLINIC | Age: 52
End: 2017-06-08

## 2017-06-08 DIAGNOSIS — Z98.890 S/P ARTHROSCOPY OF RIGHT KNEE: Primary | ICD-10-CM

## 2017-06-08 PROCEDURE — 97110 THERAPEUTIC EXERCISES: CPT | Performed by: PHYSICAL THERAPIST

## 2017-06-08 PROCEDURE — A9999 DME SUPPLY OR ACCESSORY, NOS: HCPCS | Performed by: PHYSICAL THERAPIST

## 2017-06-08 NOTE — PATIENT INSTRUCTIONS
Purpose of treatment  Qualities to look for in running sneakers  HEP performance  Please view [facility] Magali Schrader for a complete list of HEP instructions.  [employer]aband dispensed for HEP performance.

## 2017-06-08 NOTE — PROGRESS NOTES
Physical Therapy Daily Progress Note    Time In 1400  Time Out 1502    Magali Schrader reports: feeling better already and rode bike for a couple hours without difficulty today.      Subjective     Objective     Passive Range of Motion     Right Knee   Flexion: 133 (AAROM with belt) degrees      See Exercise, Manual, and Modality Logs for complete treatment.       Assessment & Plan     Assessment  Assessment details: Pt required mild verbal cues for degree of knee of flexion and for engaging local trunk muscles for increased control.  Pt reported hip muscle fatigue during treatment but no knee pain.  Progressed HEP with additional standing hip strengthening exercises today.  Pt will continue to progress with skilled physical therapy and HEP performance.          Progress strengthening /stabilization /functional activity           Manual Therapy:    5     mins  11516;  Therapeutic Exercise:    45     mins  54995;     Neuromuscular Jazmin:    -    mins  38262;    Therapeutic Activity:     -     mins  11237;     Gait Training:      -     mins  55260;     Ultrasound:     -     mins  24086;    Electrical Stimulation:    -     mins  81636 ( );  Dry Needling     -     mins self-pay    Timed Treatment:   50   mins   Total Treatment:     62   mins    Yana Anthony, PT, DPT  Physical Therapist

## 2017-06-12 ENCOUNTER — TREATMENT (OUTPATIENT)
Dept: PHYSICAL THERAPY | Facility: CLINIC | Age: 52
End: 2017-06-12

## 2017-06-12 DIAGNOSIS — Z98.890 S/P ARTHROSCOPY OF RIGHT KNEE: Primary | ICD-10-CM

## 2017-06-12 PROCEDURE — 97112 NEUROMUSCULAR REEDUCATION: CPT | Performed by: PHYSICAL THERAPIST

## 2017-06-12 PROCEDURE — 97110 THERAPEUTIC EXERCISES: CPT | Performed by: PHYSICAL THERAPIST

## 2017-06-12 NOTE — PROGRESS NOTES
Physical Therapy Daily Progress Note    Time In 1355  Time Out 1507    Magali Schrader reports: jogging about 200 ft a couple times on grass with only some stiffness after and no increased pain.    Subjective     Objective   See Exercise, Manual, and Modality Logs for complete treatment.       Assessment & Plan     Assessment  Assessment details: Pt required moderate verbal cues during therapeutic exercises for pelvis position, degree of knee flexion and gluteus carlo and medius activation.  Pt demonstrated difficulty and fatigue during hip strengthening, but no reports of knee pain during treatment.         Progress strengthening /stabilization /functional activity           Manual Therapy:    5     mins  37052;  Therapeutic Exercise:    50     mins  72795;     Neuromuscular Jazmin:    10    mins  61261;    Therapeutic Activity:     -     mins  20022;     Gait Training:      -     mins  85722;     Ultrasound:     -     mins  71983;    Electrical Stimulation:    -     mins  14189 ( );  Dry Needling     -     mins self-pay    Timed Treatment:   65   mins   Total Treatment:     72   mins    Yana Anthony, PT, DPT  Physical Therapist

## 2017-06-12 NOTE — PATIENT INSTRUCTIONS
Watch for soreness and swelling when progressing activity like running  HEP performance  Appropriate response to treatment and adequate rest  Pathology and healing time

## 2017-06-15 ENCOUNTER — TREATMENT (OUTPATIENT)
Dept: PHYSICAL THERAPY | Facility: CLINIC | Age: 52
End: 2017-06-15

## 2017-06-15 DIAGNOSIS — Z98.890 S/P ARTHROSCOPY OF RIGHT KNEE: Primary | ICD-10-CM

## 2017-06-15 PROCEDURE — 97140 MANUAL THERAPY 1/> REGIONS: CPT | Performed by: PHYSICAL THERAPIST

## 2017-06-15 PROCEDURE — 97110 THERAPEUTIC EXERCISES: CPT | Performed by: PHYSICAL THERAPIST

## 2017-06-15 NOTE — PROGRESS NOTES
Physical Therapy Daily Progress Note    Time In 1335  Time Out 1445    Magali Schrader reports: running on grassy area for about 1 hour today with increased swelling in anterior right knee but no pain.  Pt inquires about hip adduction and abduction strengthening exercises.      Subjective     Objective   See Exercise, Manual, and Modality Logs for complete treatment.       Assessment & Plan     Assessment  Assessment details: Pt presents with increased swelling in right knee today due to increased duration of running.  Pt educated on pacing of activity and gradual progression, appropriate rest and modifying due to symptoms.  Initiated additional trunk and hip strengthening exercises today with mild difficulty and without knee pain.       Progress strengthening /stabilization /functional activity  Perform running gait analysis       Manual Therapy:    10     mins  34549;  Therapeutic Exercise:    45     mins  86301;     Neuromuscular Jazmin:    -    mins  33324;    Therapeutic Activity:     -     mins  77510;     Gait Training:      -     mins  09799;     Ultrasound:     -     mins  29965;    Electrical Stimulation:    -     mins  40854 ( );  Dry Needling     -     mins self-pay    Timed Treatment:   55   mins   Total Treatment:     70   mins    Yana Anthony, PT, DPT  Physical Therapist

## 2017-06-15 NOTE — PATIENT INSTRUCTIONS
Purpose of treatment  Gradual progression of running  HEP performance  Please view [facility] Magali Schrader for a complete list of HEP instructions.

## 2017-06-23 ENCOUNTER — TREATMENT (OUTPATIENT)
Dept: PHYSICAL THERAPY | Facility: CLINIC | Age: 52
End: 2017-06-23

## 2017-06-23 DIAGNOSIS — Z98.890 S/P ARTHROSCOPY OF RIGHT KNEE: Primary | ICD-10-CM

## 2017-06-23 PROCEDURE — 97110 THERAPEUTIC EXERCISES: CPT | Performed by: PHYSICAL THERAPIST

## 2017-06-23 PROCEDURE — 97112 NEUROMUSCULAR REEDUCATION: CPT | Performed by: PHYSICAL THERAPIST

## 2017-06-23 PROCEDURE — 97140 MANUAL THERAPY 1/> REGIONS: CPT | Performed by: PHYSICAL THERAPIST

## 2017-06-23 NOTE — PROGRESS NOTES
Physical Therapy Daily Progress Note    Time In 1434  Time Out 1552    Magali Schrader reports: doing well pre-treatment.  Pt reports participating in 2 sessions of running and sprinting on grassy surfaces today without increased pain and with mild swelling.  Pt reports some sensation of stability from Kinesiotape but no other significant changes.    Visual Gait Tool  Patient Name: Magali Schrader  Diagnosis:   Encounter Diagnosis   Name Primary?   • S/P arthroscopy of right knee Yes                    Left     Frontal View     Right   [x]  Narrow - landing on midline  []  Neutral     []  Wide Step Width [x]  Narrow - land on midline     []  Neutral     []  Wide   [x]  Abducted     [] Cross-over     []  In-line Arm Movement [x]  Abducted     [] Cross-over     []  In-line   []  Ipsilateral     [x]  Neutral                     []  Contralateral Trunk []  Ipsilateral     [x]  Neutral                     []  Contralateral   [x]  Valgus      []   Neutral       []   Varus Hip Stability [x]   Valgus      []   Neutral       []   Varus   []  Abd    []  Add    [x]  Neutral   []  ?IR     []  ?ER Dynamic Knee Alignment []  Abd    [x]  Add    []  Neutral   []  ?IR     []  ?ER   []  Supinated    [x]  Neutral                      []  Pronated Midstance Pronation []  Supinated    [x]  Neutral                       []  Pronated    Maria:-       Lateral Plane      [] < 4 cm limited   [x] 4-6 cm optimal   [] > 6 cm increased  Vertical Displacement [] < 4 cm limited   [x] 4-6 cm optimal   [] > 6 cm increased    []  ?anterior     []   neutral                      [x] posterior Arm Movement []  ?anterior     []   neutral                      [x] posterior   [x]  Forward Tilt     []  Neutral             []  Backward Torso Orientation [x]  Forward Tilt     []  Neutral             []  Backward   [x]  Lordosis       []  Neutral                    []  Flat Lumbopelvic Posture [x]  Lordosis       []  Neutral                    []  Flat    []  15-20°  normal  [x]   5-15°   limited   []  <0         severe Hip Extension @ Toe Off [x]  15-20°  normal  []   5-15°   limited   []  <0         severe   []  >25°      flexed       [x]  20-25°  optimal       []  <20°      stiff  Knee Excursion @ Stance [x]  >25°      flexed       []  20-25°  optimal       []  <20°      stiff    [x]  Heel      []  Midfoot      []  Forefoot  []  Neutral Contact  [x]  Anterior to COM Foot Strike Pattern [x]  Heel      []  Midfoot      []  Forefoot  []  Neutral Contact  [x]  Anterior to COM     Assessment: Please see POC       Physical Therapist: Yana Anthony, PT, DPT    Date: 06/23/2017      Subjective     Objective     Tests     Additional Tests Details  Rotary stability FMS test R 2/3 and L 2/3 with increased lumbar lordosis     See Exercise, Manual, and Modality Logs for complete treatment.       Assessment & Plan     Assessment  Assessment details: Visual gait analysis performed for pt today indicating heel contact with foot anterior to COM which will increase forces up the LE kinetic chain including the knee.  Pt demonstrated increased knee flexion in stance on right and decreased hip extension on left.  Pt also demonstrates weakness and decreased neuromuscular control in bilateral hips and trunk.  Initiated additional rotary control/stability exercises today.  Pt continues to be attentive and inquisitive about purpose of treatment and proper form for exercise performance.   Pt will continue to improve with skilled PT and HEP performance.  Pt continues to tolerate treatment well without increased pain or swelling.         Progress strengthening /stabilization /functional activity - addressing deficits observed on visual gait analysis on treadmill           Manual Therapy:    8     mins  85238;  Therapeutic Exercise:    47     mins  94518;     Neuromuscular Jazmin:    15    mins  62560;    Therapeutic Activity:     -     mins  50486;     Gait Training:      -     mins   86888;     Ultrasound:     -     mins  12998;    Electrical Stimulation:    -     mins  20827 ( );  Dry Needling     -     mins self-pay    Timed Treatment:   70   mins   Total Treatment:     78   mins    Yana Anthony, PT, DPT  Physical Therapist

## 2017-06-24 NOTE — PATIENT INSTRUCTIONS
Running gait assessment  Purpose of treatment  Rotary control/stability and relationship to running

## 2017-06-26 ENCOUNTER — TREATMENT (OUTPATIENT)
Dept: PHYSICAL THERAPY | Facility: CLINIC | Age: 52
End: 2017-06-26

## 2017-06-26 DIAGNOSIS — Z98.890 S/P ARTHROSCOPY OF RIGHT KNEE: Primary | ICD-10-CM

## 2017-06-26 PROCEDURE — 97110 THERAPEUTIC EXERCISES: CPT | Performed by: PHYSICAL THERAPIST

## 2017-06-26 PROCEDURE — 97112 NEUROMUSCULAR REEDUCATION: CPT | Performed by: PHYSICAL THERAPIST

## 2017-06-26 NOTE — PATIENT INSTRUCTIONS
Reviewed visual gait analysis with patient and cues for increasing step with and increasing amount of trunk flexion to improve foot strike under COM  HEP performance - reviewed multifidi lift

## 2017-06-26 NOTE — PROGRESS NOTES
"Physical Therapy Daily Progress Note    Time In 1412  Time Out 1528    Magali Schrader reports: doing well and has ran up to 5 miles.  Pt reports symptoms are at a new norm.      Subjective     Objective     Active Range of Motion     Right Knee   Flexion: 145 degrees   Extension: Right knee active extension: +3 degrees hyperextension.     Swelling     Left Knee Girth Measurement (cm)   Joint line: 35.7.  10 cm above joint line: 39 cm    Right Knee Girth Measurement (cm)   Joint line: 36.1.  10 cm above joint line: 40 cm    Functional Assessment     Single Leg Squat     Right Leg  Positive Trendelenburg and valgus.     Forward Step Down 6\"     Right Leg  Positive Trendelenburg and valgus. No pain.      See Exercise, Manual, and Modality Logs for complete treatment.       Assessment & Plan     Assessment  Assessment details: Pt demonstrates improved knee AROM WNL and without pain.  Pt continues to demonstrate mild swelling surrounding Right knee that is related to level of activity.  Pt demonstrates continued weakness in hip neuromuscular control requiring frequent verbal cues and visual feedback.  Pt responded well the reactive neuromuscular training (RNT) today with improved ability to maintain hip abduction and avoid valgus position during CKC exercises.  Pt demonstrates attention to activity and motivation to improve.  Pt will continue to progress to remaining goals and full participation in exercise activity and triathalon training with continued skilled PT and HEP performance.        Progress per Plan of Care with increased attention on hip and trunk neuromuscular control and single-leg strengthening           Manual Therapy:    -     mins  61890;  Therapeutic Exercise:    50     mins  98534;     Neuromuscular Jazmin:    20    mins  13908;    Therapeutic Activity:     -     mins  22969;     Gait Training:      -     mins  36843;     Ultrasound:     -     mins  23815;    Electrical Stimulation:    -     mins  " 28733 ( );  Dry Needling     -     mins self-pay    Timed Treatment:  70   mins   Total Treatment:     76   mins    Yana Anthony, PT, DPT  Physical Therapist

## 2017-06-27 ENCOUNTER — OFFICE VISIT (OUTPATIENT)
Dept: ORTHOPEDIC SURGERY | Facility: CLINIC | Age: 52
End: 2017-06-27

## 2017-06-27 VITALS — BODY MASS INDEX: 20.86 KG/M2 | WEIGHT: 154 LBS | TEMPERATURE: 100.1 F | HEIGHT: 72 IN

## 2017-06-27 DIAGNOSIS — Z98.890 S/P ARTHROSCOPY OF KNEE: Primary | ICD-10-CM

## 2017-06-27 PROCEDURE — 99024 POSTOP FOLLOW-UP VISIT: CPT | Performed by: ORTHOPAEDIC SURGERY

## 2017-06-27 NOTE — PROGRESS NOTES
Knee Scope follow Up       Patient: Magali Schrader        YOB: 1965      Chief Complaints: Right knee pain  Chief Complaint   Patient presents with   • Right Knee - Post-op         History of Present Illness: Pt is here f/u knee arthroscopyShe's doing great she's progressing her running she is running out of 5 miles without pain and overall is very happy        Allergies: No Known Allergies    Medications:   Home Medications:  Current Outpatient Prescriptions on File Prior to Visit   Medication Sig   • COLLAGEN PO Take 1 tablet by mouth Daily. Pt to stop 1 week before surgery   • Multiple Vitamin (MULTI-DAY VITAMINS) tablet Take 1 tablet by mouth Daily. Pt to stop 1 week before surgery     No current facility-administered medications on file prior to visit.      Current Medications:  Scheduled Meds:  Continuous Infusions:  No current facility-administered medications for this visit.   PRN Meds:.          Physical Exam: 52 y.o. female  General Appearance:    Alert, cooperative, in no acute distress                 Vitals:    06/27/17 1546   Temp: 100.1 °F (37.8 °C)   TempSrc: Temporal Artery       Patient is alert and oriented ×3 no acute distress normal mood physical exam.  Physical exam of the knee, incisions looked good there is no erythema, calf is soft and non-tender.  No sign or sx of DVT      Assessment  S/P knee scope.  Overall doing well. She can continue progress her activities        Plan: Continue with strengthening, progression of activities and follow-up as needed

## 2017-06-29 ENCOUNTER — TREATMENT (OUTPATIENT)
Dept: PHYSICAL THERAPY | Facility: CLINIC | Age: 52
End: 2017-06-29

## 2017-06-29 DIAGNOSIS — Z98.890 S/P ARTHROSCOPY OF RIGHT KNEE: Primary | ICD-10-CM

## 2017-06-29 PROCEDURE — 97110 THERAPEUTIC EXERCISES: CPT | Performed by: PHYSICAL THERAPIST

## 2017-06-29 NOTE — PROGRESS NOTES
Physical Therapy Daily Progress Note    Time In 1102  Time Out 1202    Magali Schrader reports: from MD visit: pleased with progress and surprised pt was not yet running 13 miles.  Pt reports running 5 miles this morning with only mild swelling in right knee.     Subjective     Objective   See Exercise, Manual, and Modality Logs for complete treatment.       Assessment & Plan     Assessment  Assessment details: Initiated additional rotation AROM with hip extension and trunk control/stability exercises.  Pt demonstrates difficulty with left trunk rotation greater than right rotation. Pt also demonstrates difficulty with single-leg stability with arms overhead during step up activity, but able to complete repetitions without loss of balance.  Pt is appropriate to progress running distance with education on monitoring knee pain and swelling.          Progress strengthening /stabilization /functional activity           Manual Therapy:    -     mins  63589;  Therapeutic Exercise:    55   mins  34622;     Neuromuscular Jazmin:    -    mins  65701;    Therapeutic Activity:     -     mins  69547;     Gait Training:      -     mins  94035;     Ultrasound:     -     mins  45906;    Electrical Stimulation:    -     mins  95013 ( );  Dry Needling     -     mins self-pay    Timed Treatment:   55   mins   Total Treatment:     60   mins    Yana Anthony, PT, DPT  Physical Therapist

## 2017-07-07 ENCOUNTER — TREATMENT (OUTPATIENT)
Dept: PHYSICAL THERAPY | Facility: CLINIC | Age: 52
End: 2017-07-07

## 2017-07-07 DIAGNOSIS — Z98.890 S/P ARTHROSCOPY OF RIGHT KNEE: Primary | ICD-10-CM

## 2017-07-07 PROCEDURE — 97112 NEUROMUSCULAR REEDUCATION: CPT | Performed by: PHYSICAL THERAPIST

## 2017-07-07 PROCEDURE — 97110 THERAPEUTIC EXERCISES: CPT | Performed by: PHYSICAL THERAPIST

## 2017-07-07 NOTE — PROGRESS NOTES
"   Physical Therapy Daily Progress Note      Magali Schrader reports: her knee felt great while she was away on vacation. Reports she has been able to run 10 miles with no lingering discomfort. Reports today she is feeling a little more \"pressure\" than normal, but did do a more intense bike ride yesterday.       Objective  See Exercise, Manual, and Modality Logs for complete treatment.     Assessment  Pt performed well with all progress strengthening and stabilization exercises. Pt did require moderate VCs and TCs to ensure trunk and pelvic position with progressed exercises. Pt would benefit from additional skilled PT to continue to progress LE strength and stability.   Progress per Plan of Care-Reassess next visit    Therapy Exercise 34586 45 minutes and NMR 61095 15 minutes    Timed Code Treatment: 60   Minutes     Total Treatment Time: 68      Minutes    Anita Lanza, PT, DPT  Physical Therapist #[id number]        "

## 2017-07-14 ENCOUNTER — TREATMENT (OUTPATIENT)
Dept: PHYSICAL THERAPY | Facility: CLINIC | Age: 52
End: 2017-07-14

## 2017-07-14 DIAGNOSIS — Z98.890 S/P ARTHROSCOPY OF RIGHT KNEE: Primary | ICD-10-CM

## 2017-07-14 PROCEDURE — 97110 THERAPEUTIC EXERCISES: CPT | Performed by: PHYSICAL THERAPIST

## 2017-07-14 PROCEDURE — 97112 NEUROMUSCULAR REEDUCATION: CPT | Performed by: PHYSICAL THERAPIST

## 2017-07-14 NOTE — PROGRESS NOTES
"Physical Therapy Daily Progress Note    Time In 1400  Time Out 1458    Magali Schrader reports: continued medial knee joint pain after running on asphalt.  Pt reports ability to run increased distance with pain that was managed with pain reliever.  Pt reports compliance with HEP.      Subjective     Objective     Functional Assessment     Forward Step Down 6\"     Right Leg  Positive Trendelenburg and valgus. No pain.      See Exercise, Manual, and Modality Logs for complete treatment.       Assessment & Plan     Assessment  Assessment details: Pt required mild verbal cues for neutral pelvis and spine during therapeutic exercises.  Pt has met goal associated with running distance.  Pt demonstrates continued decreased neuromuscular control in right LE and trunk control/stabilization with step down activity.  Pt demonstrated improved performance and control in R LE with verbal cueing and on decreased step height.        Progress per Plan of Care - 1x/week            Manual Therapy:    -     mins  18998;  Therapeutic Exercise:    45     mins  58317;     Neuromuscular Jazmin:    10    mins  42905;    Therapeutic Activity:     -     mins  06590;     Gait Training:      -     mins  47895;     Ultrasound:     -     mins  08416;    Electrical Stimulation:    -     mins  47902 ( );  Dry Needling     -     mins self-pay    Timed Treatment:   55   mins   Total Treatment:     58   mins    Yana Anthony, PT, DPT  Physical Therapist    "

## 2017-07-14 NOTE — PATIENT INSTRUCTIONS
HEP performance  Pathology and involved anatomy  Please view [facility] Magali Schrader for a complete list of HEP instructions.

## 2017-07-24 ENCOUNTER — TREATMENT (OUTPATIENT)
Dept: PHYSICAL THERAPY | Facility: CLINIC | Age: 52
End: 2017-07-24

## 2017-07-24 DIAGNOSIS — Z98.890 S/P ARTHROSCOPY OF RIGHT KNEE: Primary | ICD-10-CM

## 2017-07-24 PROCEDURE — 97110 THERAPEUTIC EXERCISES: CPT | Performed by: PHYSICAL THERAPIST

## 2017-07-24 NOTE — PROGRESS NOTES
"Re-Assessment / Re-Certification    Time In 1535     Time Out 1635    Patient: Magali Schrader   : 1965  Diagnosis/ICD-10 Code:  S/P arthroscopy of right knee [Z98.890]  Referring practitioner: Talia Niño MD  Date of Initial Visit: 2017  Today's Date: 2017  Patient seen for 10 sessions      Subjective:   Magali Schrader reports: doing well and ran 11 miles in 2 days with some discomfort in medial knee.  Subjective Questionnaire: LEFS: 74/80  Clinical Progress: improved  Home Program Compliance: Yes  Treatment has included: therapeutic exercise, neuromuscular re-education, manual therapy, therapeutic activity, gait training, electrical stimulation and cryotherapy    Subjective   Objective     Active Range of Motion     Right Knee   Flexion: 143 degrees   Extension: Right knee active extension: +3 deg hyperextension.     Strength/Myotome Testing     Right Hip   Planes of Motion   Flexion: 5  Extension: 4+  Abduction: 5  Adduction: 4+    Isolated Muscles   Gluteus maximums: 4    Right Knee   Prone flexion: 5  Extension: 5  Quadriceps contraction: good    Swelling     Left Knee Girth Measurement (cm)   Joint line: 34.7.  10 cm above joint line: 39.2.    Right Knee Girth Measurement (cm)   Joint line: 34.5.  10 cm above joint line: 39.8.    Functional Assessment   Squat   Sitting toward left side. No left valgus and no right valgus.     Single Leg Squat     Right Leg  Positive Trendelenburg. Not right trunk side bending and no valgus.     Forward Step Down 6\"     Right Leg  Positive Trendelenburg, increased forward trunk lean and valgus.      Assessment & Plan     Assessment  Impairments: impaired physical strength  Assessment details: Pt demonstrates improved LE strength and neuromuscular control, but continues to demonstrate decreased neuromuscular control during step down and single-leg squat activity.  Pt requires moderate verbal cues for equal weight bearing, level pelvis and anterior knee " position to avoid valgus during therapeutic exercises.  Pt demonstrated difficulty with proprioception and single-leg hop tasks, and encouraged on home performance to improve strength and function.  Pt requires continued skilled physical therapy to improve proprioception, neuromuscular control, and weight bearing on Right LE in order to return to prior level of function including distance running without swelling or pain.  Prognosis: good    Plan  Therapy options: will be seen for skilled physical therapy services  Planned modality interventions: thermotherapy (hydrocollator packs), TENS, cryotherapy and electrical stimulation/Russian stimulation  Planned therapy interventions: abdominal trunk stabilization, body mechanics training, functional ROM exercises, gait training, home exercise program, joint mobilization, manual therapy, neuromuscular re-education, soft tissue mobilization, spinal/joint mobilization, strengthening, stretching and therapeutic activities  Frequency: 1x week  Duration in weeks: 3  Treatment plan discussed with: patient  Functional Limitations: uncomfortable because of pain    Progress toward previous goals: Partially Met       New Goals  Short-term goals (STG): In 1 week:  1. Pt will perform bilateral squat with equal weight bearing for improved tolerance to WB on involved LE and increased neuromuscular control.  2. Pt will perform single-leg squat 5 times consecutively with level pelvis, negative anterior translation of tibia and without pain to demonstrate improved strength for athletic activities.    Long-term goals (LTG): In 3 weeks:  1. Pt will perform 6 inch step down 5 times repetitively without pain and with level pelvis.    2. Right hip extension strength improve to 5-/5 to demonstrate improved strength for squatting and running.   3. LEFS score improve to 80/80 to demonstrate functional improvement  4. Pt will demonstrate understanding and compliance with HEP for continued  strengthening and to maintain gains.       Recommendations: Continue as planned  Timeframe: 3 weeks  Prognosis to achieve goals: good    PT Signature: Yana Anthony, PT, DPT      Based upon review of the patient's progress and continued therapy plan, it is my medical opinion that Magali Schrader should continue physical therapy treatment at [facility] D[facility]  [facility]  [address] [id number] [phone].    Signature: __________________________________  Talia H MD [place]    Manual Therapy:    -     mins  50611;  Therapeutic Exercise:    40     mins  59420;     Neuromuscular Jazmin:    10    mins  06912;    Therapeutic Activity:     -     mins  19663;     Gait Training:      -     mins  63968;     Ultrasound:     -     mins  23740;    Electrical Stimulation:    -     mins  47285 ( );  Dry Needling     -     mins self-pay    Timed Treatment:   50   mins   Total Treatment:     60   mins    Please sign and return via fax to [phone]. Thank you, [facility].

## 2017-07-26 ENCOUNTER — OFFICE VISIT (OUTPATIENT)
Dept: INTERNAL MEDICINE | Facility: CLINIC | Age: 52
End: 2017-07-26

## 2017-07-26 VITALS
SYSTOLIC BLOOD PRESSURE: 110 MMHG | WEIGHT: 148 LBS | BODY MASS INDEX: 20.05 KG/M2 | HEIGHT: 72 IN | HEART RATE: 58 BPM | DIASTOLIC BLOOD PRESSURE: 72 MMHG | OXYGEN SATURATION: 98 %

## 2017-07-26 DIAGNOSIS — L72.0 EPIDERMOID CYST OF SKIN OF CHEST: Primary | ICD-10-CM

## 2017-07-26 PROCEDURE — 99213 OFFICE O/P EST LOW 20 MIN: CPT | Performed by: NURSE PRACTITIONER
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
## 2017-07-31 ENCOUNTER — TREATMENT (OUTPATIENT)
Dept: PHYSICAL THERAPY | Facility: CLINIC | Age: 52
End: 2017-07-31

## 2017-07-31 DIAGNOSIS — Z98.890 S/P ARTHROSCOPY OF RIGHT KNEE: Primary | ICD-10-CM

## 2017-07-31 PROCEDURE — 97110 THERAPEUTIC EXERCISES: CPT | Performed by: PHYSICAL THERAPIST

## 2017-07-31 PROCEDURE — 97112 NEUROMUSCULAR REEDUCATION: CPT | Performed by: PHYSICAL THERAPIST

## 2017-07-31 NOTE — PROGRESS NOTES
"Physical Therapy Daily Progress Note    Time In 1203  Time Out 1300    Magali Schrader reports: trying to find her \"new norm\". And able to run and perform HEP with only fatigue and no increased pain.  Pt reports occasional quick and sharp pain like a razor cutting through butter in her knee and less \"shock absorption.\"    Subjective     Objective     Functional Assessment     Forward Step Down 6\"     Right Leg  Positive Trendelenburg. No pain and no valgus.      See Exercise, Manual, and Modality Logs for complete treatment.       Assessment & Plan     Assessment  Assessment details: Progressed some LE strengthening exercises today with mild difficulty and without knee pain.  Pt demonstrates improved single-leg stability, but continues to require external resistance RNT to maintain anterior knee position and verbal and visual cues to maintain level pelvis during CKC strengthening exercises.         Progress per Plan of Care           Manual Therapy:    -     mins  34130;  Therapeutic Exercise:    44     mins  86974;     Neuromuscular Jazmin:    10    mins  86507;    Therapeutic Activity:     -     mins  36331;     Gait Training:      -     mins  40362;     Ultrasound:     -     mins  80211;    Electrical Stimulation:    -     mins  43002 ( );  Dry Needling     -     mins self-pay    Timed Treatment:   54   mins   Total Treatment:     57   mins    Yana Anthony, PT, DPT  Physical Therapist    "

## 2017-07-31 NOTE — PATIENT INSTRUCTIONS
COM over foot contact when running to decrease stress through LE kinetic chain  Encouraged pt to talk to MD about current symptoms and plan for medical intervention in the future.

## 2017-08-11 ENCOUNTER — TREATMENT (OUTPATIENT)
Dept: PHYSICAL THERAPY | Facility: CLINIC | Age: 52
End: 2017-08-11

## 2017-08-11 DIAGNOSIS — Z98.890 S/P ARTHROSCOPY OF RIGHT KNEE: Primary | ICD-10-CM

## 2017-08-11 PROCEDURE — 97110 THERAPEUTIC EXERCISES: CPT | Performed by: PHYSICAL THERAPIST

## 2017-08-11 PROCEDURE — 97112 NEUROMUSCULAR REEDUCATION: CPT | Performed by: PHYSICAL THERAPIST

## 2017-08-11 NOTE — PROGRESS NOTES
"Physical Therapy Daily Progress Note    Time In 1400  Time Out 1452    Magali Schrader reports: doing well and is tired from hard training.  Pt reports trying to find the new norm with her knee and swimming and running without her knee \"blowing up too much\".    Subjective     Objective     Functional Assessment   Squat No pain, no trunk lean left, no left valgus, no left tibial anterior translation beyond toes, no trunk lean right, no right valgus and no right tibial anterior translation beyond toes.     Single Leg Squat     Right Leg  Positive Trendelenburg. No valgus.     Forward Step Down 6\"     Right Leg  Positive Trendelenburg. No valgus.      See Exercise, Manual, and Modality Logs for complete treatment.       Assessment & Plan     Assessment  Assessment details: Pt demonstrates improved weight bearing through R LE and neuromuscular control during single-leg squat and step down activity.  Pt continues to require verbal cues and visual feedback to maintain level pelvis during single-leg activities.  Pt demonstrates excellent compliance with HEP and will continue to progress with skilled PT 1x every 10 days and continued HEP performance.        Progress strengthening /stabilization /functional activity           Manual Therapy:    -     mins  43180;  Therapeutic Exercise:    35     mins  94565;     Neuromuscular Jazmin:    10   mins  56919;    Therapeutic Activity:     -     mins  46640;     Gait Training:      -     mins  51897;     Ultrasound:     -     mins  90359;    Electrical Stimulation:    -     mins  28138 ( );  Dry Needling     -     mins self-pay    Timed Treatment:   45   mins   Total Treatment:     52   mins    Yana Anthony, PT, DPT  Physical Therapist    "

## 2017-08-18 ENCOUNTER — TREATMENT (OUTPATIENT)
Dept: PHYSICAL THERAPY | Facility: CLINIC | Age: 52
End: 2017-08-18

## 2017-08-18 DIAGNOSIS — Z98.890 S/P ARTHROSCOPY OF RIGHT KNEE: Primary | ICD-10-CM

## 2017-08-18 PROCEDURE — 97112 NEUROMUSCULAR REEDUCATION: CPT | Performed by: PHYSICAL THERAPIST

## 2017-08-18 PROCEDURE — 97110 THERAPEUTIC EXERCISES: CPT | Performed by: PHYSICAL THERAPIST

## 2017-08-18 PROCEDURE — 97140 MANUAL THERAPY 1/> REGIONS: CPT | Performed by: PHYSICAL THERAPIST

## 2017-08-18 NOTE — PROGRESS NOTES
Physical Therapy Daily Progress Note    Time In 1400  Time Out 1506    Magali Schrader reports: participated in [place]ath[place] this weekend and felt good while running, but increased swelling in right knee during the week due to increased running distance.     Subjective     Objective   See Exercise, Manual, and Modality Logs for complete treatment.       Assessment & Plan     Assessment  Assessment details: Pt demonstrates increased inflammation surrounding knee related to level of activity.  Pt demonstrates improved LE neuromuscular control during weight bearing exercises.  Pt instructed to monitor knee symptoms as training progressions as indication to modify activity as needed.          Progress strengthening /stabilization /functional activity           Manual Therapy:    8     mins  82762;  Therapeutic Exercise:    37     mins  40270;     Neuromuscular Jazmin:    10    mins  12036;    Therapeutic Activity:     -     mins  80008;     Gait Training:      -     mins  19405;     Ultrasound:     -     mins  61666;    Electrical Stimulation:    -     mins  51786 ( );  Dry Needling     -     mins self-pay    Timed Treatment:   55   mins   Total Treatment:     66   mins    Yana Anthony, PT, DPT  Physical Therapist

## 2017-08-18 NOTE — PATIENT INSTRUCTIONS
Monitor swelling and knee pain with increased training activity  Compression sleeve to manage knee swelling and symptoms  Home Kinesiotape application

## 2017-08-25 ENCOUNTER — TREATMENT (OUTPATIENT)
Dept: PHYSICAL THERAPY | Facility: CLINIC | Age: 52
End: 2017-08-25

## 2017-08-25 DIAGNOSIS — Z98.890 S/P ARTHROSCOPY OF RIGHT KNEE: Primary | ICD-10-CM

## 2017-08-25 PROCEDURE — 97110 THERAPEUTIC EXERCISES: CPT | Performed by: PHYSICAL THERAPIST

## 2017-08-25 PROCEDURE — A9999 DME SUPPLY OR ACCESSORY, NOS: HCPCS | Performed by: PHYSICAL THERAPIST

## 2017-08-25 PROCEDURE — 97112 NEUROMUSCULAR REEDUCATION: CPT | Performed by: PHYSICAL THERAPIST

## 2017-08-25 NOTE — PROGRESS NOTES
"Physical Therapy Daily Progress Note    Time In 1402  Time Out 1504    Magali Schrader reports: this was a bad week for HEP performance    Subjective     Objective     Functional Assessment     Forward Step Down 6\"     Right Leg  Valgus. No pain and negative Trendelenburg.      See Exercise, Manual, and Modality Logs for complete treatment.       Assessment & Plan     Assessment  Assessment details: Pt demonstrates continued decreased neuromuscular control during step down and requires mild verbal cues, resistive and visual feedback for improved performance.  Pt reported difference in rotary stability when comparing sides and motivation to practice rotary stability exercises independently.          Progress strengthening /stabilization /functional activity           Manual Therapy:    -     mins  01537;  Therapeutic Exercise:    45     mins  73503;     Neuromuscular Jazmin:    10    mins  29583;    Therapeutic Activity:     -     mins  60847;     Gait Training:      -     mins  42833;     Ultrasound:     -     mins  63805;    Electrical Stimulation:    -     mins  46169 ( );  Dry Needling     -     mins self-pay    Timed Treatment:   55   mins   Total Treatment:     62   mins    Yana Anthony, PT, DPT  Physical Therapist    "

## 2017-09-01 ENCOUNTER — TREATMENT (OUTPATIENT)
Dept: PHYSICAL THERAPY | Facility: CLINIC | Age: 52
End: 2017-09-01

## 2017-09-01 DIAGNOSIS — Z98.890 S/P ARTHROSCOPY OF RIGHT KNEE: Primary | ICD-10-CM

## 2017-09-01 PROCEDURE — 97112 NEUROMUSCULAR REEDUCATION: CPT | Performed by: PHYSICAL THERAPIST

## 2017-09-01 PROCEDURE — 97110 THERAPEUTIC EXERCISES: CPT | Performed by: PHYSICAL THERAPIST

## 2017-09-01 NOTE — PROGRESS NOTES
"Physical Therapy Daily Progress Note    Time In 1202  Time Out 1256    Magali Schrader reports: able to run long distance yesterday with compression sleeve on and without increased knee pain or swelling.    Subjective     Objective     Functional Assessment   Squat No pain, not sitting toward left side, no left valgus, no left tibial anterior translation beyond toes, not sitting toward right side, no right valgus and no right tibial anterior translation beyond toes.     Single Leg Squat   Left Leg  Negative Trendelenburg and no valgus.     Right Leg  Positive Trendelenburg. No pain and no valgus.     Forward Step Down 6\"     Right Leg  Positive Trendelenburg. No valgus.      See Exercise, Manual, and Modality Logs for complete treatment.       Assessment & Plan     Assessment  Assessment details: Pt demonstrates improved LE strength, neuromuscular control and tolerance to activity.  Pt demonstrated good neuromuscular control during bilateral and single-leg squat and has met associated goals.  Pt continues to demonstrate difficulty with controlled step down, but form improved with verbal cueing and visual feedback from mirror.  Pt demonstrates good understanding of HEP, but benefits from mild verbal cues to improve form with therapeutic exercises and would benefit from additional coaching in  Performance Training.      Plan  Plan details: Trial d/c to (I) with HEP and consideration for  Performance Training        Other - d/c to (I) with HEP, call PT as needed and consider  Performance Training           Manual Therapy:    -     mins  64209;  Therapeutic Exercise:    35     mins  02791;     Neuromuscular Jazmin:    10    mins  65566;    Therapeutic Activity:     -     mins  32465;     Gait Training:      -     mins  96156;     Ultrasound:     -     mins  44823;    Electrical Stimulation:    -     mins  26869 ( );  Dry Needling     -     mins self-pay    Timed Treatment:   45   mins   Total Treatment:     " 54   mins    Yana Anthony, PT, DPT  Physical Therapist

## 2017-09-13 DIAGNOSIS — Z12.11 ENCOUNTER FOR SCREENING COLONOSCOPY: Primary | ICD-10-CM

## 2017-09-13 NOTE — H&P
Date: 2017     Patient: Magali Schrader    : 1965   [id number]     CC:  Screening Colonoscopy    History:   The patient is a 52 y.o. female of Diego Pugh MD  who presents to discuss screening colonoscopy. The patient currently has no complaints.  Patient denies any history of nausea, abdominal pain, weight loss, change in bowel habits or rectal bleeding.  Patient denies melena, hematochezia or BRBPR.  No family history of ulcerative colitis, Crohn's disease, familial polyposis or colon cancer.    The following portions of the patient's history were reviewed and updated as appropriate: allergies, current medications, past family history, past medical history, past social history, past surgical history and problem list.    Past Medical History:   Diagnosis Date   • Hyperlipidemia    • Sinusitis     history      Past Surgical History:   Procedure Laterality Date   •  SECTION  2003   • KNEE ARTHROSCOPY Right 2017    Procedure: RT KNEE ARTHROSCOPY, PARTIAL MEDIAL MENISECTOMY, DEBRIDEMENT OF ARTHRITIS;  Surgeon: Talia Niño MD;  Location: [facility];  Service:    • TONSILLECTOMY       Medications:   (Not in a hospital admission)  Allergies: Review of patient's allergies indicates no known allergies.   Social History:  Social History     Social History   • Marital status: Unknown     Spouse name: N/A   • Number of children: N/A   • Years of education: N/A     Social History Main Topics   • Smoking status: Never Smoker   • Smokeless tobacco: Never Used   • Alcohol use 0.0 - 3.0 oz/week     0 - 5 Shots of liquor per week   • Drug use: No   • Sexual activity: Not Asked     Other Topics Concern   • None     Social History Narrative      Family History   Problem Relation Age of Onset   • Alzheimer's disease Mother         Review of Systems:   General: Patient reports good health  Eyes: No eye problems  Ears, nose, mouth and throat: No rhinitis, no hearing problems, no chronic  cough  Cardiovascular/heart: Denies palpitations, syncope or chest pain  Respiratory/lung: Denies shortness of breath, hemoptysis, or dyspnea on exertion   Genital/urinary: No frequency, hematuria or dysuria  Hematological/lymphatic: Denies anemia or other problems  Musculoskeletal: No joint pain, no defects  Skin: No psoriasis or other skin issues  Neurological: No seizures or other neurological problems  Psychiatric: None  Endocrine: Negative  Gastro-intestinal: No constipation, no diarrhea, no melena, no hematochezia    Physical Examination:  General: Alert and oriented x3 in no acute distress  HEENT: Normal cephalic, atraumatic, PERRLA, EOMI, sclera anicteric, moist mucous membranes, neck is supple, no JVD, no carotid bruits, no thyromegaly no adenopathy  Chest: CTA and percussion  CVA: RRR, normal S1-S2, no murmurs, no gallops or rubs  Abdomen: Positive BS, soft, nondistended, nontender, no rebound, no guarding, no hernias, no organomegaly and no masses  Extremities: Full range of motion, no clubbing, no cyanosis or edema  Neurovascular: Grossly intact    Impression:  52 y.o. female for screening colonoscopy.    Plan:  Patient is presenting for screening colonoscopy.  I have recommended that the patient undergo a screening colonoscopy in accordance of American Cancer Society's guidelines.  I have discussed this procedure in detail with the patient.  I have discussed the risks, benefits and alternatives.  I have discussed the risk of anesthesia, bleeding and perforation.  Patient understands these risks, benefits and alternatives and wishes to proceed.      Ashanti Harley MD  General, Minimally Invasive and Endoscopic Surgery  [facility]    [address], [address]  [place], [place], [address]  P: [phone]  F: [phone]    CC: Diego Pugh MD

## 2017-09-27 DIAGNOSIS — Z12.11 ENCOUNTER FOR SCREENING COLONOSCOPY: Primary | ICD-10-CM

## 2017-10-17 ENCOUNTER — TELEPHONE (OUTPATIENT)
Dept: ORTHOPEDIC SURGERY | Facility: CLINIC | Age: 52
End: 2017-10-17

## 2017-10-17 NOTE — TELEPHONE ENCOUNTER
Yes I think she knows it is not covered by insurance and it is $300.  If she wishes to do it we need to do it at the [facility] office with amb  available to draw her blood

## 2017-10-18 PROBLEM — Z12.11 ENCOUNTER FOR SCREENING COLONOSCOPY: Status: ACTIVE | Noted: 2017-10-18

## 2017-10-23 ENCOUNTER — CLINICAL SUPPORT (OUTPATIENT)
Dept: ORTHOPEDIC SURGERY | Facility: CLINIC | Age: 52
End: 2017-10-23

## 2017-10-23 VITALS — HEIGHT: 72 IN | TEMPERATURE: 97.7 F

## 2017-10-23 DIAGNOSIS — G89.29 CHRONIC PAIN OF RIGHT KNEE: ICD-10-CM

## 2017-10-23 DIAGNOSIS — M25.561 CHRONIC PAIN OF RIGHT KNEE: ICD-10-CM

## 2017-10-23 PROCEDURE — 0232T NJX PLATELET PLASMA: CPT | Performed by: ORTHOPAEDIC SURGERY

## 2017-10-23 NOTE — PROGRESS NOTES
"PRP Knee Joint Injection      Patient: Magali Schrader        YOB: 1965            Chief Complaints:Right  Knee pain      History of Present Illness: Pt gets intermittent  injections with good relief. Is here for PRP injection.  She understands it is still experimental and insurance's eyes and is $300 .  She recently ran the [place] tolerated that very well and would like to try the therapy Understands options.      Physical Exam: 52 y.o. female  General Appearance:    Alert, cooperative, in no acute distress                   Vitals:    10/23/17 1327   Temp: 97.7 °F (36.5 °C)   TempSrc: Temporal Artery    Height: 73\" (185.4 cm)      Patient is alert and read ×3 no acute distress appears her above-listed at height weight and age.  Affect is normal respiratory rate is normal unlabored. Heart rate regular rate rhythm, sclera, dentition and hearing are normal for the purpose of this exam.  Exam and complaints are unchanged.      Procedure:  Procedures    Shira did draw her blood was spun down injected approximately 3 and half cc of platelet's.  She tolerated this well      Assessment. Persistent knee pain      Plan: Is to proceed with injection    The knee joint was injected under strict sterile technique with PRPthis was done sterilely and tolerated tolerated well. Shira Moise her blood this all was smooth            "

## 2017-11-04 ENCOUNTER — ANESTHESIA EVENT (OUTPATIENT)
Dept: GASTROENTEROLOGY | Facility: HOSPITAL | Age: 52
End: 2017-11-04

## 2017-11-04 ENCOUNTER — HOSPITAL ENCOUNTER (OUTPATIENT)
Facility: HOSPITAL | Age: 52
Setting detail: HOSPITAL OUTPATIENT SURGERY
Discharge: HOME | End: 2017-11-04
Attending: SURGERY | Admitting: SURGERY

## 2017-11-04 ENCOUNTER — ANESTHESIA (OUTPATIENT)
Dept: GASTROENTEROLOGY | Facility: HOSPITAL | Age: 52
End: 2017-11-04

## 2017-11-04 VITALS
TEMPERATURE: 97.9 F | RESPIRATION RATE: 14 BRPM | OXYGEN SATURATION: 99 % | HEART RATE: 56 BPM | BODY MASS INDEX: 20.23 KG/M2 | WEIGHT: 153.3 LBS | DIASTOLIC BLOOD PRESSURE: 93 MMHG | SYSTOLIC BLOOD PRESSURE: 112 MMHG

## 2017-11-04 PROCEDURE — S0260 H&P FOR SURGERY: HCPCS | Performed by: SURGERY

## 2017-11-04 PROCEDURE — 25010000002 PROPOFOL 10 MG/ML EMULSION: Performed by: ANESTHESIOLOGY

## 2017-11-04 PROCEDURE — 45378 DIAGNOSTIC COLONOSCOPY: CPT | Performed by: SURGERY

## 2017-11-04 RX ORDER — GLYCOPYRROLATE 0.2 MG/ML
INJECTION INTRAMUSCULAR; INTRAVENOUS AS NEEDED
Status: DISCONTINUED | OUTPATIENT
Start: 2017-11-04 | End: 2017-11-04 | Stop reason: SURG

## 2017-11-04 RX ORDER — PROPOFOL 10 MG/ML
VIAL (ML) INTRAVENOUS CONTINUOUS PRN
Status: DISCONTINUED | OUTPATIENT
Start: 2017-11-04 | End: 2017-11-04 | Stop reason: SURG

## 2017-11-04 RX ORDER — LIDOCAINE HYDROCHLORIDE 10 MG/ML
0.5 INJECTION, SOLUTION INFILTRATION; PERINEURAL ONCE AS NEEDED
Status: DISCONTINUED | OUTPATIENT
Start: 2017-11-04 | End: 2017-11-04 | Stop reason: HOSPADM

## 2017-11-04 RX ORDER — PROPOFOL 10 MG/ML
VIAL (ML) INTRAVENOUS AS NEEDED
Status: DISCONTINUED | OUTPATIENT
Start: 2017-11-04 | End: 2017-11-04 | Stop reason: SURG

## 2017-11-04 RX ORDER — SODIUM CHLORIDE, SODIUM LACTATE, POTASSIUM CHLORIDE, CALCIUM CHLORIDE 600; 310; 30; 20 MG/100ML; MG/100ML; MG/100ML; MG/100ML
1000 INJECTION, SOLUTION INTRAVENOUS CONTINUOUS PRN
Status: DISCONTINUED | OUTPATIENT
Start: 2017-11-04 | End: 2017-11-04 | Stop reason: HOSPADM

## 2017-11-04 RX ORDER — SODIUM CHLORIDE 0.9 % (FLUSH) 0.9 %
3 SYRINGE (ML) INJECTION AS NEEDED
Status: DISCONTINUED | OUTPATIENT
Start: 2017-11-04 | End: 2017-11-04 | Stop reason: HOSPADM

## 2017-11-04 RX ADMIN — SODIUM CHLORIDE, POTASSIUM CHLORIDE, SODIUM LACTATE AND CALCIUM CHLORIDE 1000 ML: 600; 310; 30; 20 INJECTION, SOLUTION INTRAVENOUS at 08:36

## 2017-11-04 RX ADMIN — PROPOFOL 250 MCG/KG/MIN: 10 INJECTION, EMULSION INTRAVENOUS at 09:12

## 2017-11-04 RX ADMIN — GLYCOPYRROLATE 0.2 MCG: 0.2 INJECTION INTRAMUSCULAR; INTRAVENOUS at 09:27

## 2017-11-04 RX ADMIN — PROPOFOL 80 MG: 10 INJECTION, EMULSION INTRAVENOUS at 09:12

## 2017-11-04 RX ADMIN — PROPOFOL 40 MG: 10 INJECTION, EMULSION INTRAVENOUS at 09:14

## 2017-11-04 RX ADMIN — PROPOFOL 40 MG: 10 INJECTION, EMULSION INTRAVENOUS at 09:20

## 2017-11-04 NOTE — PLAN OF CARE
Problem: Patient Care Overview (Adult)  Goal: Plan of Care Review  Outcome: Ongoing (interventions implemented as appropriate)    11/04/17 0811   Coping/Psychosocial Response Interventions   Plan Of Care Reviewed With patient   Patient Care Overview   Progress progress toward functional goals as expected       Goal: Adult Individualization and Mutuality  Outcome: Ongoing (interventions implemented as appropriate)    11/04/17 0811   Individualization   Patient Specific Preferences none identified       Goal: Discharge Needs Assessment  Outcome: Ongoing (interventions implemented as appropriate)    11/04/17 0811   Discharge Needs Assessment   Concerns To Be Addressed no discharge needs identified   Discharge Disposition home or self-care   Living Environment   Transportation Available car;family or friend will provide         Problem: GI Endoscopy (Adult)  Goal: Signs and Symptoms of Listed Potential Problems Will be Absent or Manageable (GI Endoscopy)  Outcome: Ongoing (interventions implemented as appropriate)    11/04/17 0811   GI Endoscopy   Problems Assessed (GI Endoscopy) all

## 2017-11-04 NOTE — H&P
Date: 2017     Patient: Magali Schrader    : 1965   [id number]     CC:  Screening Colonoscopy    History:   The patient is a 52 y.o. female of Diego Pugh MD  who presents to discuss screening colonoscopy. The patient currently has no complaints.  Patient denies any history of nausea, abdominal pain, weight loss, change in bowel habits or rectal bleeding.  Patient denies melena, hematochezia or BRBPR.  No family history of ulcerative colitis, Crohn's disease, familial polyposis or colon cancer.    The following portions of the patient's history were reviewed and updated as appropriate: allergies, current medications, past family history, past medical history, past social history, past surgical history and problem list.    Past Medical History:   Diagnosis Date   • Hyperlipidemia    • Sinusitis     history      Past Surgical History:   Procedure Laterality Date   •  SECTION  2003   • KNEE ARTHROSCOPY Right 2017    Procedure: RT KNEE ARTHROSCOPY, PARTIAL MEDIAL MENISECTOMY, DEBRIDEMENT OF ARTHRITIS;  Surgeon: Talia Niño MD;  [place] [facility] [place] [facility] [place]  Service:    • TONSILLECTOMY       Medications:   Prescriptions Prior to Admission   Medication Sig Dispense Refill Last Dose   • COLLAGEN PO Take 1 tablet by mouth Daily. Pt to stop 1 week before surgery   11/3/2017 at Unknown time   • Multiple Vitamin (MULTI-DAY VITAMINS) tablet Take 1 tablet by mouth Daily. Pt to stop 1 week before surgery   10/31/2017     Allergies: Review of patient's allergies indicates no known allergies.   Social History:  Social History     Social History   • Marital status: Unknown     Spouse name: N/A   • Number of children: N/A   • Years of education: N/A     Social History Main Topics   • Smoking status: Never Smoker   • Smokeless tobacco: Never Used   • Alcohol use 0.0 - 3.0 oz/week     0 - 5 Shots of liquor per week   • Drug use: No   • Sexual activity: Not Asked     Other Topics Concern   • None      Social History Narrative      Family History   Problem Relation Age of Onset   • Alzheimer's disease Mother    • Malig Hyperthermia Neg Hx         Review of Systems:   General: Patient reports good health  Eyes: No eye problems  Ears, nose, mouth and throat: No rhinitis, no hearing problems, no chronic cough  Cardiovascular/heart: Denies palpitations, syncope or chest pain  Respiratory/lung: Denies shortness of breath, hemoptysis, or dyspnea on exertion   Genital/urinary: No frequency, hematuria or dysuria  Hematological/lymphatic: Denies anemia or other problems  Musculoskeletal: No joint pain, no defects  Skin: No psoriasis or other skin issues  Neurological: No seizures or other neurological problems  Psychiatric: None  Endocrine: Negative  Gastro-intestinal: No constipation, no diarrhea, no melena, no hematochezia    Physical Examination:  General: Alert and oriented x3 in no acute distress  HEENT: Normal cephalic, atraumatic, PERRLA, EOMI, sclera anicteric, moist mucous membranes, neck is supple, no JVD, no carotid bruits, no thyromegaly no adenopathy  Chest: CTA and percussion  CVA: RRR, normal S1-S2, no murmurs, no gallops or rubs  Abdomen: Positive BS, soft, nondistended, nontender, no rebound, no guarding, no hernias, no organomegaly and no masses  Extremities: Full range of motion, no clubbing, no cyanosis or edema  Neurovascular: Grossly intact    Impression:  52 y.o. female for screening colonoscopy.    Plan:  Patient is presenting for screening colonoscopy.  I have recommended that the patient undergo a screening colonoscopy in accordance of American Cancer Society's guidelines.  I have discussed this procedure in detail with the patient.  I have discussed the risks, benefits and alternatives.  I have discussed the risk of anesthesia, bleeding and perforation.  Patient understands these risks, benefits and alternatives and wishes to proceed.      Ashanti Harley MD  General, Minimally Invasive  and Endoscopic Surgery  [facility]    [address], [address]  [place], [place], [address]  P: [phone]  F: [phone]    CC: Diego Pugh MD

## 2017-11-04 NOTE — OP NOTE
Operative Note:    Pre-op dx: Screening Colonoscopy    Post-op dx: screening colonoscopy    Procedure: Colonoscopy    Surgeon: Ashanti Harley MD    Anesthesia: MAC    EBL: none    Specimen:  none    Complications: none    Procedure:    Colonoscopy:  A digital rectal examination was performed which revealed no rectal masses.  Scope was introduced into the rectum and advanced into the sigmoid, descending colon, splenic flexure, transverse colon, hepatic flexure, ascending colon and into the cecum.  Cecum was identified by the ileocecal valve and appendiceal orifice.  There was no evidence of any diverticulosis, polyps, masses, AV malformation or inflammation.  The bowel preparation was excellent. The scope was slowly withdrawn and a second look was performed.  Upon the second look, there were no new findings.  The colon was desufflated and the procedure was terminated.   Patient was transferred to recovery room in stable condition.      Assessment/Plan:  Repeat colonoscopy in 10 years.      Ashanti Harley MD  General, Minimally Invasive and Endoscopic Surgery  [facility]    [address], [address]  [place], [place], [address]  P: [phone]  F: [phone]    Cc:  Diego Pugh MD

## 2017-11-04 NOTE — ANESTHESIA POSTPROCEDURE EVALUATION
Patient: Magali Schrader    Procedure Summary     Date Anesthesia Start Anesthesia Stop Room / Location    11/04/17 0910 0939  [facility]       Procedure Diagnosis Surgeon Provider    COLONOSCOPY to cecum (N/A ) Encounter for screening colonoscopy  (Encounter for screening colonoscopy [Z12.11]) MD Alejandro Lo MD          Anesthesia Type: MAC  Last vitals  BP   112/93 (11/04/17 1000)   Temp   36.6 °C (97.9 °F) (11/04/17 0940)   Pulse   56 (11/04/17 1000)   Resp   14 (11/04/17 1000)     SpO2   99 % (11/04/17 1000)     Post Anesthesia Care and Evaluation    Patient location during evaluation: bedside  Patient participation: complete - patient participated  Level of consciousness: awake  Pain score: 1  Pain management: adequate  Airway patency: patent  Anesthetic complications: No anesthetic complications    Cardiovascular status: acceptable  Respiratory status: acceptable  Hydration status: acceptable    Comments: --------------------            11/04/17               1000     --------------------   BP:       112/93     Pulse:      56       Resp:       14       Temp:                SpO2:      99%      --------------------

## 2017-11-04 NOTE — ANESTHESIA PREPROCEDURE EVALUATION
Anesthesia Evaluation     Patient summary reviewed and Nursing notes reviewed          Airway   Mallampati: II  TM distance: >3 FB  Neck ROM: full  no difficulty expected  Dental - normal exam     Pulmonary - negative pulmonary ROS and normal exam   Cardiovascular - negative cardio ROS and normal exam        Neuro/Psych- negative ROS  GI/Hepatic/Renal/Endo - negative ROS     Musculoskeletal (-) negative ROS    Abdominal  - normal exam   Substance History - negative use     OB/GYN negative ob/gyn ROS         Other                                        Anesthesia Plan    ASA 2     MAC     intravenous induction   Anesthetic plan and risks discussed with patient.

## 2017-12-08 ENCOUNTER — DOCUMENTATION (OUTPATIENT)
Dept: PHYSICAL THERAPY | Facility: CLINIC | Age: 52
End: 2017-12-08

## 2017-12-08 DIAGNOSIS — Z98.890 S/P ARTHROSCOPY OF RIGHT KNEE: Primary | ICD-10-CM

## 2017-12-08 NOTE — PROGRESS NOTES
Discharge Report    Patient Name: Magali Schrader       Patient MRN: [id number]  : 1965  Physician:  Date: 2017    Encounter Diagnoses   Name Primary?   • S/P arthroscopy of right knee Yes     Treatment has included therapeutic exercise, neuromuscular re-education, manual therapy, electrical stimulation and cryotherapy      Assessment from last treatment 17:  Pt demonstrates improved LE strength, neuromuscular control and tolerance to activity.  Pt demonstrated good neuromuscular control during bilateral and single-leg squat and has met associated goals.  Pt continues to demonstrate difficulty with controlled step down, but form improved with verbal cueing and visual feedback from mirror.  Pt demonstrates good understanding of HEP, but benefits from mild verbal cues to improve form with therapeutic exercises and would benefit from additional coaching in  Performance Training.     Plan  Plan details: Trial d/c to (I) with HEP and consideration for  Performance Training         Other - d/c to (I) with HEP, call PT as needed and consider  Performance Training    Assessment: Pt did not call requiring further physical therapy and has not initiated Performance Training to date.  Pt is d/c to (I) with initial HEP.    Progress towards goals: All Met    Discharge Reason: Patient achieved goals      Plan of Care: Continue with current home exercise program as instructed    Prognosis: excellent    Understanding at Discharge: excellent     Yana Anthony PT, DPT

## 2018-01-30 ENCOUNTER — CLINICAL SUPPORT (OUTPATIENT)
Dept: ORTHOPEDIC SURGERY | Facility: CLINIC | Age: 53
End: 2018-01-30

## 2018-01-30 VITALS — HEIGHT: 72 IN | WEIGHT: 150.8 LBS | BODY MASS INDEX: 20.42 KG/M2 | TEMPERATURE: 99.4 F

## 2018-01-30 DIAGNOSIS — Z98.890 S/P ARTHROSCOPY OF KNEE: Primary | ICD-10-CM

## 2018-01-30 PROCEDURE — 0232T NJX PLATELET PLASMA: CPT | Performed by: ORTHOPAEDIC SURGERY

## 2018-01-30 NOTE — PROGRESS NOTES
"Knee Follow Up      Patient: Magali Schrader        YOB: 1965            Chief Complaints: knee pain right      History of Present Illness: This patient is here follow-up of right knee pain she is done PRP injections in the past and his had good relief she would like to do another one she understands it is still experimental and insurance his eyes    Physical Exam: 53 y.o. female  General Appearance:    Alert, cooperative, in no acute distress                   Vitals:    01/30/18 1446   Temp: 99.4 °F (37.4 °C)   TempSrc: Temporal Artery    Weight: 68.4 kg (150 lb 12.8 oz)   Height: 185.4 cm (73\")        Patient is alert and read ×3 no acute distress appears her above-listed at height weight and age.  Affect is normal respiratory rate is normal unlabored. Heart rate regular rate rhythm, sclera, dentition and hearing are normal for the purpose of this exam.      Ortho Exam     Physical exam of the right knee reveals no effusion, no erythema.  It mild loss of extension and full flexion  Patient has mild varus alignment.  They have mild tenderness to palpation about the medial compartment, no tenderness laterally..  The patient has a negative bounce home, negative Alexandra and a stable ligamentous exam.  Quad tone is reasonable and symmetric.  There are no overlying skin changes no lymphedema no lymphadenopathy.  There is good hip range of motion which is full symmetric and asymptomatic and a normal ankle exam.              Assessment/Plan:      Right knee pain with some known degenerative changes plan is proceed with PRP.  Shira harvested the blood responded and injected without difficulty.      "

## 2018-05-15 ENCOUNTER — CLINICAL SUPPORT (OUTPATIENT)
Dept: ORTHOPEDIC SURGERY | Facility: CLINIC | Age: 53
End: 2018-05-15

## 2018-05-15 VITALS — HEIGHT: 72 IN | TEMPERATURE: 97.9 F | BODY MASS INDEX: 20.59 KG/M2 | WEIGHT: 152 LBS

## 2018-05-15 DIAGNOSIS — M17.11 PRIMARY LOCALIZED OSTEOARTHROSIS OF RIGHT LOWER LEG: Primary | ICD-10-CM

## 2018-05-15 PROCEDURE — 0232T NJX PLATELET PLASMA: CPT | Performed by: ORTHOPAEDIC SURGERY

## 2018-05-15 NOTE — PROGRESS NOTES
"Right Knee Joint Injection      Patient: Magali Schrader        YOB: 1965            Chief Complaints:right Knee pain  Chief Complaint   Patient presents with   • Right Knee - Pain         History of Present Illness: Pt gets intermittent  injections with good relief. Is here for repeat injection. Understands options.She is done a PRP injection with good results would like to do that again she understands it is still experimental      Physical Exam: 53 y.o. female  General Appearance:    Alert, cooperative, in no acute distress                   Vitals:    05/15/18 1459   Temp: 97.9 °F (36.6 °C)   Weight: 68.9 kg (152 lb)   Height: 185.4 cm (73\")      Patient is alert and read ×3 no acute distress appears her above-listed at height weight and age.  Affect is normal respiratory rate is normal unlabored. Heart rate regular rate rhythm, sclera, dentition and hearing are normal for the purpose of this exam.  Exam and complaints are unchanged.      Procedure:  Procedures    Right knee was injected sterilely with PRP subsidence.  Shira sanchez the blood was placed in the center fusion and was injected sterilely she tolerated it well      Assessment. Persistent knee pain      Plan: Is to proceed with injection          "

## 2018-06-25 ENCOUNTER — TELEPHONE (OUTPATIENT)
Dept: ORTHOPEDIC SURGERY | Facility: CLINIC | Age: 53
End: 2018-06-25

## 2018-08-13 ENCOUNTER — CLINICAL SUPPORT (OUTPATIENT)
Dept: ORTHOPEDIC SURGERY | Facility: CLINIC | Age: 53
End: 2018-08-13

## 2018-08-13 ENCOUNTER — TELEPHONE (OUTPATIENT)
Dept: ORTHOPEDIC SURGERY | Facility: CLINIC | Age: 53
End: 2018-08-13

## 2018-08-13 VITALS — HEIGHT: 72 IN | BODY MASS INDEX: 20.99 KG/M2 | WEIGHT: 155 LBS | TEMPERATURE: 98.4 F

## 2018-08-13 DIAGNOSIS — M17.11 PRIMARY LOCALIZED OSTEOARTHROSIS OF RIGHT LOWER LEG: Primary | ICD-10-CM

## 2018-08-13 PROCEDURE — 0232T NJX PLATELET PLASMA: CPT | Performed by: ORTHOPAEDIC SURGERY

## 2018-08-13 NOTE — PROGRESS NOTES
"Right Knee Joint Injection      Patient: Magali Schrader        YOB: 1965            Chief Complaints:right Knee pain  Chief Complaint   Patient presents with   • Right Knee - Injections         History of Present Illness: Pt gets intermittent  injections with good relief. Is here for repeat injection. Understands options.      Physical Exam: 53 y.o. female  General Appearance:    Alert, cooperative, in no acute distress                   Vitals:    08/13/18 1349   Temp: 98.4 °F (36.9 °C)   Weight: 70.3 kg (155 lb)   Height: 185.4 cm (73\")      Patient is alert and read ×3 no acute distress appears her above-listed at height weight and age.  Affect is normal respiratory rate is normal unlabored. Heart rate regular rate rhythm, sclera, dentition and hearing are normal for the purpose of this exam.  Exam and complaints are unchanged.      Procedure:  Procedures    Myers was injected with PRP.  Shira harvested the blood we spun it down and injected sterilely from an anteromedial aspect of the knee she tolerated it well      Assessment. Persistent knee pain      Plan: Is to proceed with injection    The knee joint was injected under strict sterile technique with Marcaine and Depo-Medrol this was done sterilely and tolerated tolerated well.  She understands insurance does not cover this and is $300          "

## 2018-09-25 ENCOUNTER — APPOINTMENT (OUTPATIENT)
Dept: GENERAL RADIOLOGY | Facility: HOSPITAL | Age: 53
End: 2018-09-25

## 2018-09-25 ENCOUNTER — HOSPITAL ENCOUNTER (EMERGENCY)
Facility: HOSPITAL | Age: 53
Discharge: HOME | End: 2018-09-25
Attending: EMERGENCY MEDICINE | Admitting: EMERGENCY MEDICINE

## 2018-09-25 VITALS
BODY MASS INDEX: 20.99 KG/M2 | SYSTOLIC BLOOD PRESSURE: 125 MMHG | HEIGHT: 72 IN | HEART RATE: 67 BPM | OXYGEN SATURATION: 100 % | TEMPERATURE: 97 F | WEIGHT: 155 LBS | RESPIRATION RATE: 16 BRPM | DIASTOLIC BLOOD PRESSURE: 78 MMHG

## 2018-09-25 DIAGNOSIS — S42.035A CLOSED NONDISPLACED FRACTURE OF ACROMIAL END OF LEFT CLAVICLE, INITIAL ENCOUNTER: Primary | ICD-10-CM

## 2018-09-25 DIAGNOSIS — S42.125A CLOSED NONDISPLACED FRACTURE OF LEFT ACROMIAL PROCESS, INITIAL ENCOUNTER: ICD-10-CM
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
## 2018-09-25 PROCEDURE — 73030 X-RAY EXAM OF SHOULDER: CPT

## 2018-09-25 PROCEDURE — 99283 EMERGENCY DEPT VISIT LOW MDM: CPT
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
## 2018-09-27 ENCOUNTER — OFFICE VISIT (OUTPATIENT)
Dept: ORTHOPEDIC SURGERY | Facility: CLINIC | Age: 53
End: 2018-09-27

## 2018-09-27 VITALS — BODY MASS INDEX: 20.99 KG/M2 | WEIGHT: 155 LBS | TEMPERATURE: 98.6 F | HEIGHT: 72 IN

## 2018-09-27 DIAGNOSIS — S42.035A CLOSED NONDISPLACED FRACTURE OF ACROMIAL END OF LEFT CLAVICLE, INITIAL ENCOUNTER: Primary | ICD-10-CM

## 2018-09-27 PROCEDURE — 99213 OFFICE O/P EST LOW 20 MIN: CPT | Performed by: ORTHOPAEDIC SURGERY
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
## 2018-10-18 ENCOUNTER — OFFICE VISIT (OUTPATIENT)
Dept: ORTHOPEDIC SURGERY | Facility: CLINIC | Age: 53
End: 2018-10-18

## 2018-10-18 VITALS — TEMPERATURE: 98.2 F | BODY MASS INDEX: 20.99 KG/M2 | HEIGHT: 72 IN | WEIGHT: 155 LBS

## 2018-10-18 DIAGNOSIS — Z09 FRACTURE FOLLOW-UP: Primary | ICD-10-CM

## 2018-10-18 PROCEDURE — 73030 X-RAY EXAM OF SHOULDER: CPT | Performed by: ORTHOPAEDIC SURGERY

## 2018-10-18 PROCEDURE — 99024 POSTOP FOLLOW-UP VISIT: CPT | Performed by: ORTHOPAEDIC SURGERY

## 2018-10-18 NOTE — PROGRESS NOTES
"Left Shoulder Follow Up      Patient: Magali Schrader        YOB: 1965            Chief Complaints:left Shoulder pain  Chief Complaint   Patient presents with   • Left Shoulder - Follow-up         History of Present Illness: Patient's here follow-up of left shoulder clavicle fracture she states doing great she's moving fine she like to return to work on 5 November which I think is fine.  Also talking about her right hamstring injury which she has      Physical Exam: 53 y.o. female  General Appearance:    Alert, cooperative, in no acute distress                   Vitals:    10/18/18 1016   Temp: 98.2 °F (36.8 °C)   Weight: 70.3 kg (155 lb)   Height: 185.4 cm (73\")        Patient is alert and read ×3 no acute distress appears her above-listed at height weight and age.  Affect is normal respiratory rate is normal unlabored. Heart rate regular rate rhythm, sclera, dentition and hearing are normal for the purpose of this exam.      Ortho Exam    Physical exam of the left shoulder reveals no overlying skin changes no lymphedema no lymphadenopathy.  Patient has active flexion 180 with mild symptoms abduction is similar external rotation is to 50 and internal rotation to the upper lumbar spine with mild symptoms.  Patient has good rotator cuff strength 4+ over 5 with isometric strength testing with pain.  Patient has a positive impingement and a positive Limon sign.  Patient has good cervical range of motion which is full and asymptomatic no radicular symptoms.  Patient has a normal elbow exam.  Good distal pulses are presentPatient has pain with overhead activity and a positive Neer sign and a positive empty can sign  They have a positive drop arm any definitive painful arc      X-rays AP and scapular Y of the left shoulder were taken to evaluate her fracture compared to previous films she has a fracture of the lateral one third of the clavicle but appears to be healing and is in near anatomic " position      Assessment/Plan:      Lateral clavicle fracture healing fine I think she will return to work on the fifth all see her back 6 weeks for one last x-ray

## 2018-11-12 ENCOUNTER — TELEPHONE (OUTPATIENT)
Dept: ORTHOPEDIC SURGERY | Facility: CLINIC | Age: 53
End: 2018-11-12

## 2018-11-12 ENCOUNTER — CLINICAL SUPPORT (OUTPATIENT)
Dept: ORTHOPEDIC SURGERY | Facility: CLINIC | Age: 53
End: 2018-11-12

## 2018-11-12 VITALS — BODY MASS INDEX: 20.99 KG/M2 | HEIGHT: 72 IN | TEMPERATURE: 97.7 F | WEIGHT: 155 LBS

## 2018-11-12 DIAGNOSIS — G89.29 CHRONIC PAIN OF RIGHT KNEE: Primary | ICD-10-CM

## 2018-11-12 DIAGNOSIS — M17.11 PRIMARY LOCALIZED OSTEOARTHROSIS OF RIGHT LOWER LEG: ICD-10-CM

## 2018-11-12 DIAGNOSIS — M25.561 CHRONIC PAIN OF RIGHT KNEE: Primary | ICD-10-CM

## 2018-11-12 PROCEDURE — 73562 X-RAY EXAM OF KNEE 3: CPT | Performed by: ORTHOPAEDIC SURGERY

## 2018-11-12 PROCEDURE — 0232T NJX PLATELET PLASMA: CPT | Performed by: ORTHOPAEDIC SURGERY

## 2018-11-12 NOTE — PROGRESS NOTES
"Right Knee PRP Injection      Patient: Magali Schrader        YOB: 1965            Chief Complaints: right Knee pain  Chief Complaint   Patient presents with   • Right Knee - Injections         History of Present Illness: Pt gets intermittent  injections with good relief. Is here for repeat injection. Understands options.      Physical Exam: 53 y.o. female  General Appearance:    Alert, cooperative, in no acute distress                   Vitals:    11/12/18 1348   Temp: 97.7 °F (36.5 °C)   Weight: 70.3 kg (155 lb)   Height: 185.4 cm (73\")      Patient is alert and read ×3 no acute distress appears her above-listed at height weight and age.  Affect is normal respiratory rate is normal unlabored. Heart rate regular rate rhythm, sclera, dentition and hearing are normal for the purpose of this exam.  Exam and complaints are unchanged.      Procedure:  Procedures    Shira Moise her blood was spun down I injected 3 cc of platelets sterilely into her right knee she tolerated it well  X-rays AP lateral merchant view of the right knee were taken to evaluate her symptoms and compared to previous films she does have some mild patellofemoral OA and some mild narrowing about her medial compartment these have progressed somewhat since her last x-rays    Assessment. Persistent knee pain      Plan: Is to proceed with injection              "

## 2018-11-30 ENCOUNTER — OFFICE VISIT (OUTPATIENT)
Dept: ORTHOPEDIC SURGERY | Facility: CLINIC | Age: 53
End: 2018-11-30

## 2018-11-30 VITALS — TEMPERATURE: 98.5 F | WEIGHT: 155 LBS | BODY MASS INDEX: 20.99 KG/M2 | HEIGHT: 72 IN

## 2018-11-30 DIAGNOSIS — Z09 FRACTURE FOLLOW-UP: Primary | ICD-10-CM

## 2018-11-30 PROCEDURE — 99024 POSTOP FOLLOW-UP VISIT: CPT | Performed by: ORTHOPAEDIC SURGERY

## 2018-11-30 PROCEDURE — 73030 X-RAY EXAM OF SHOULDER: CPT | Performed by: ORTHOPAEDIC SURGERY

## 2018-11-30 NOTE — PROGRESS NOTES
"Left Shoulder Follow Up      Patient: Magali Schrader        YOB: 1965            Chief Complaints: Left Shoulder pain  Chief Complaint   Patient presents with   • Left Shoulder - Follow-up, Pain         History of Present Illness: Patient's here follow-up of left clavicle fracture she states she has no pain at all she is able to do everything she wants to do  Physical Exam: 53 y.o. female  General Appearance:    Alert, cooperative, in no acute distress                   Vitals:    11/30/18 1136   Temp: 98.5 °F (36.9 °C)   TempSrc: Temporal   Weight: 70.3 kg (155 lb)   Height: 185.4 cm (73\")        Patient is alert and read ×3 no acute distress appears her above-listed at height weight and age.  Affect is normal respiratory rate is normal unlabored. Heart rate regular rate rhythm, sclera, dentition and hearing are normal for the purpose of this exam.      Ortho Exam      's exam left shoulder she has full range of motion all directions no palpable tenderness in area the fracture    X-rays AP scapular Y left shoulder were taken to evaluate her fracture compared to previous films she has a lateral one third clavicle fracture that is not healed factors a very distinct fracture gap with some fracture callus present        Assessment/Plan:      Left shoulder clavicle fracture it is not healed radiographically I want her to quit doing pushups she can do other things are on some taxing to the shoulder I will see her back 6-8 weeks with an x-ray          "

## 2019-02-11 ENCOUNTER — CLINICAL SUPPORT (OUTPATIENT)
Dept: ORTHOPEDIC SURGERY | Facility: CLINIC | Age: 54
End: 2019-02-11

## 2019-02-11 ENCOUNTER — TELEPHONE (OUTPATIENT)
Dept: ORTHOPEDIC SURGERY | Facility: CLINIC | Age: 54
End: 2019-02-11

## 2019-02-11 VITALS — WEIGHT: 155 LBS | BODY MASS INDEX: 20.99 KG/M2 | HEIGHT: 72 IN | TEMPERATURE: 98.1 F

## 2019-02-11 DIAGNOSIS — M17.11 PRIMARY LOCALIZED OSTEOARTHROSIS OF RIGHT LOWER LEG: Primary | ICD-10-CM

## 2019-02-11 PROCEDURE — 0232T NJX PLATELET PLASMA: CPT | Performed by: ORTHOPAEDIC SURGERY

## 2019-02-11 NOTE — PROGRESS NOTES
"Right PRP Knee Joint Injection      Patient: Magali Schrader        YOB: 1965            Chief Complaints:right Knee pain  Chief Complaint   Patient presents with   • Right Knee - Injections         History of Present Illness: Pt gets intermittent  injections with good relief. Is here for repeat injection. Understands options.      Physical Exam: 54 y.o. female  General Appearance:    Alert, cooperative, in no acute distress                   Vitals:    02/11/19 1328   Temp: 98.1 °F (36.7 °C)   Weight: 70.3 kg (155 lb)   Height: 185.4 cm (73\")      Patient is alert and read ×3 no acute distress appears her above-listed at height weight and age.  Affect is normal respiratory rate is normal unlabored. Heart rate regular rate rhythm, sclera, dentition and hearing are normal for the purpose of this exam.  Exam and complaints are unchanged.      Procedure:  Procedures          Assessment. Persistent knee pain      Plan: Is to proceed with injection    The knee joint was injected under strict sterile technique with PRP she understands it is still experimental to the insurance company and it is $500 Shira harvested the blood it was spun down and I injected it sterilely          "

## 2019-03-15 ENCOUNTER — OFFICE VISIT (OUTPATIENT)
Dept: CARDIOLOGY | Facility: CLINIC | Age: 54
End: 2019-03-15

## 2019-03-15 ENCOUNTER — TELEPHONE (OUTPATIENT)
Dept: ORTHOPEDIC SURGERY | Facility: CLINIC | Age: 54
End: 2019-03-15

## 2019-03-15 VITALS
SYSTOLIC BLOOD PRESSURE: 98 MMHG | DIASTOLIC BLOOD PRESSURE: 68 MMHG | HEART RATE: 47 BPM | BODY MASS INDEX: 22.27 KG/M2 | HEIGHT: 72 IN | WEIGHT: 164.4 LBS

## 2019-03-15 DIAGNOSIS — M25.512 LEFT SHOULDER PAIN, UNSPECIFIED CHRONICITY: Primary | ICD-10-CM

## 2019-03-15 DIAGNOSIS — I45.6 VENTRICULAR PRE-EXCITATION: Primary | ICD-10-CM

## 2019-03-15 DIAGNOSIS — R94.31 ABNORMAL EKG: ICD-10-CM

## 2019-03-15 PROCEDURE — 99204 OFFICE O/P NEW MOD 45 MIN: CPT | Performed by: INTERNAL MEDICINE
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
## 2019-03-19 NOTE — PROGRESS NOTES
Date of Office Visit: 03/15/2019  Encounter Provider: Gurjit Mcfarlane MD  Place of Service: [facility] CARDIOLOGY  Patient Name: Magali Schrader  :1965    Chief complaint: Abnormal EKG, ventricular pre-excitation.    History of Present Illness:    I had the pleasure seeing the patient in cardiology office on 3/15/2019.  She is a   very pleasant 54 year-old female with a history of ventricular preexcitation and   hyperlipidemia who presents for evaluation.  The patient is a  for [employer], and is   required to have EKGs periodically.  Her EKG from 3/12/2019 showed evidence   of preexcitation with delta waves.  This was changed from her EKG in .    However, upon review, it appears that she saw Dr. Holm in our group for the   same thing in .  She has had absolutely no symptoms, including palpitations,   syncope, or presyncope.  She denied any shortness of breath or chest pain.  In   fact, she has participated in 8 Iron Man competitions, and recently ran 21 miles   in a triathlon.  She is very active in general.  There is no history of sudden cardiac   death or arrhythmia in her family.  Her father did have a CABG in his early 70s.    Past Medical History:   Diagnosis Date   • Hyperlipidemia    • Sinus bradycardia    • Sinusitis     history   • Ventricular pre-excitation        Past Surgical History:   Procedure Laterality Date   •  SECTION  2003   • COLONOSCOPY N/A 2017    Procedure: COLONOSCOPY to cecum;  Surgeon: Ashanti Harley MD;  Location: [facility] ENDOSCOPY;  Service:    • KNEE ARTHROSCOPY Right 2017    Procedure: RT KNEE ARTHROSCOPY, PARTIAL MEDIAL MENISECTOMY, DEBRIDEMENT OF ARTHRITIS;  Surgeon: Talia Niño MD;  Location: [facility] OR OSC;  Service:    • TONSILLECTOMY         Current Outpatient Medications on File Prior to Visit   Medication Sig Dispense Refill   • COLLAGEN PO Take 1 tablet by mouth Daily. Pt to stop 1 week before  "surgery     • Multiple Vitamin (MULTI-DAY VITAMINS) tablet Take 1 tablet by mouth Daily. Pt to stop 1 week before surgery       No current facility-administered medications on file prior to visit.      Allergies as of 03/15/2019   • (No Known Allergies)     Social History     Socioeconomic History   • Marital status:      Spouse name: Not on file   • Number of children: Not on file   • Years of education: Not on file   • Highest education level: Not on file   Social Needs   • Financial resource strain: Not on file   • Food insecurity - worry: Not on file   • Food insecurity - inability: Not on file   • Transportation needs - medical: Not on file   • Transportation needs - non-medical: Not on file   Occupational History   • Not on file   Tobacco Use   • Smoking status: Never Smoker   • Smokeless tobacco: Never Used   Substance and Sexual Activity   • Alcohol use: Yes     Alcohol/week: 0.0 - 3.0 oz     Comment: Socially    • Drug use: No   • Sexual activity: Defer   Other Topics Concern   • Not on file   Social History Narrative   • Not on file     Family History   Problem Relation Age of Onset   • Alzheimer's disease Mother    • Coronary artery disease Father         Father with CABG in early 70's    • Malig Hyperthermia Neg Hx        Review of Systems   All other systems reviewed and are negative.     Objective:     Vitals:    03/15/19 0818   BP: 98/68   Pulse: (!) 47   Weight: 74.6 kg (164 lb 6.4 oz)   Height: 185.4 cm (73\")     Body mass index is 21.69 kg/m².    Physical Exam   Constitutional: She is oriented to person, place, and time. She appears well-developed and well-nourished.   HENT:   Head: Normocephalic and atraumatic.   Eyes: Conjunctivae are normal.   Neck: Neck supple.   Cardiovascular: Regular rhythm. Bradycardia present. Exam reveals no gallop and no friction rub.   No murmur heard.  Pulmonary/Chest: Effort normal and breath sounds normal.   Abdominal: Soft. There is no tenderness. "   Musculoskeletal: She exhibits no edema.   Neurological: She is alert and oriented to person, place, and time.   Skin: Skin is warm.   Psychiatric: She has a normal mood and affect. Her behavior is normal.     Lab Review:   Procedures      Assessment:       Diagnosis Plan   1. Ventricular pre-excitation  Holter Monitor - 24 Hour    Adult Transthoracic Echo Complete W/ Cont if Necessary Per Protocol   2. Abnormal EKG  Holter Monitor - 24 Hour    Adult Transthoracic Echo Complete W/ Cont if Necessary Per Protocol     Plan:       Again, the patient has preexcitation on her recent EKG, although she is completely   asymptomatic at this time.  She has denied any chest discomfort, shortness of breath,   palpitations, syncope, or near syncope.  She has run an 8 Iron Man competitions, and   recently went 21 miles in a triathlon without difficulty.  Dr. Holm saw her in 2013 for   similar issues, and cleared her to fly at that time.  Her work-up at that point was normal.    I will check a 24-hour Holter monitor to assess for any potential arrhythmias.  I will also   check an echocardiogram again at this point.  Her heart has been structurally normal in   the past.  She has no evidence of SVT or Vel-Parkinson-White syndrome, and I   suspect that this is not causing any problems.  In the absence of symptoms, and if her   other testing is normal, I do not feel there will be an issue with clearing her to fly (as she   is a  for [employer]).  Further plans will be made pending the results of the above testing.

## 2019-03-20 ENCOUNTER — TELEPHONE (OUTPATIENT)
Dept: CARDIOLOGY | Facility: CLINIC | Age: 54
End: 2019-03-20

## 2019-03-21 ENCOUNTER — APPOINTMENT (OUTPATIENT)
Dept: PHYSICAL THERAPY | Facility: HOSPITAL | Age: 54
End: 2019-03-21

## 2019-03-21 ENCOUNTER — HOSPITAL ENCOUNTER (OUTPATIENT)
Dept: CARDIOLOGY | Facility: HOSPITAL | Age: 54
Discharge: HOME | End: 2019-03-21
Admitting: INTERNAL MEDICINE

## 2019-03-21 VITALS
WEIGHT: 164 LBS | OXYGEN SATURATION: 99 % | HEIGHT: 72 IN | DIASTOLIC BLOOD PRESSURE: 70 MMHG | HEART RATE: 47 BPM | BODY MASS INDEX: 22.21 KG/M2 | SYSTOLIC BLOOD PRESSURE: 100 MMHG

## 2019-03-21 DIAGNOSIS — I45.6 VENTRICULAR PRE-EXCITATION: ICD-10-CM

## 2019-03-21 DIAGNOSIS — R94.31 ABNORMAL EKG: ICD-10-CM

## 2019-03-21 LAB
AORTIC ARCH: 2.5 CM
AORTIC DIMENSIONLESS INDEX: 0.8 (DI)
ASCENDING AORTA: 2.6 CM
BH CV ECHO MEAS - ACS: 2 CM
BH CV ECHO MEAS - AO MAX PG (FULL): 3.3 MMHG
BH CV ECHO MEAS - AO MAX PG: 8 MMHG
BH CV ECHO MEAS - AO MEAN PG (FULL): 2 MMHG
BH CV ECHO MEAS - AO MEAN PG: 5 MMHG
BH CV ECHO MEAS - AO ROOT AREA (BSA CORRECTED): 1.3
BH CV ECHO MEAS - AO ROOT AREA: 5.3 CM^2
BH CV ECHO MEAS - AO ROOT DIAM: 2.6 CM
BH CV ECHO MEAS - AO V2 MAX: 141 CM/SEC
BH CV ECHO MEAS - AO V2 MEAN: 105 CM/SEC
BH CV ECHO MEAS - AO V2 VTI: 36.4 CM
BH CV ECHO MEAS - AVA(I,A): 2.6 CM^2
BH CV ECHO MEAS - AVA(I,D): 2.6 CM^2
BH CV ECHO MEAS - AVA(V,A): 2.4 CM^2
BH CV ECHO MEAS - AVA(V,D): 2.4 CM^2
BH CV ECHO MEAS - BSA(HAYCOCK): 2 M^2
BH CV ECHO MEAS - BSA: 2 M^2
BH CV ECHO MEAS - BZI_BMI: 21.6 KILOGRAMS/M^2
BH CV ECHO MEAS - BZI_METRIC_HEIGHT: 185.4 CM
BH CV ECHO MEAS - BZI_METRIC_WEIGHT: 74.4 KG
BH CV ECHO MEAS - EDV(MOD-SP2): 76 ML
BH CV ECHO MEAS - EDV(MOD-SP4): 79 ML
BH CV ECHO MEAS - EDV(TEICH): 103.9 ML
BH CV ECHO MEAS - EF(CUBED): 69.9 %
BH CV ECHO MEAS - EF(MOD-BP): 62 %
BH CV ECHO MEAS - EF(MOD-SP2): 68.4 %
BH CV ECHO MEAS - EF(MOD-SP4): 57 %
BH CV ECHO MEAS - EF(TEICH): 61.5 %
BH CV ECHO MEAS - ESV(MOD-SP2): 24 ML
BH CV ECHO MEAS - ESV(MOD-SP4): 34 ML
BH CV ECHO MEAS - ESV(TEICH): 40 ML
BH CV ECHO MEAS - FS: 33 %
BH CV ECHO MEAS - IVS/LVPW: 0.84
BH CV ECHO MEAS - IVSD: 0.91 CM
BH CV ECHO MEAS - LAT PEAK E' VEL: 11 CM/SEC
BH CV ECHO MEAS - LV DIASTOLIC VOL/BSA (35-75): 39.9 ML/M^2
BH CV ECHO MEAS - LV MASS(C)D: 166.4 GRAMS
BH CV ECHO MEAS - LV MASS(C)DI: 84.1 GRAMS/M^2
BH CV ECHO MEAS - LV MAX PG: 4.7 MMHG
BH CV ECHO MEAS - LV MEAN PG: 3 MMHG
BH CV ECHO MEAS - LV SYSTOLIC VOL/BSA (12-30): 17.2 ML/M^2
BH CV ECHO MEAS - LV V1 MAX: 108 CM/SEC
BH CV ECHO MEAS - LV V1 MEAN: 83.5 CM/SEC
BH CV ECHO MEAS - LV V1 VTI: 30.2 CM
BH CV ECHO MEAS - LVIDD: 4.7 CM
BH CV ECHO MEAS - LVIDS: 3.2 CM
BH CV ECHO MEAS - LVLD AP2: 7.8 CM
BH CV ECHO MEAS - LVLD AP4: 7.5 CM
BH CV ECHO MEAS - LVLS AP2: 6.5 CM
BH CV ECHO MEAS - LVLS AP4: 5.9 CM
BH CV ECHO MEAS - LVOT AREA (M): 3.1 CM^2
BH CV ECHO MEAS - LVOT AREA: 3.1 CM^2
BH CV ECHO MEAS - LVOT DIAM: 2 CM
BH CV ECHO MEAS - LVPWD: 1.1 CM
BH CV ECHO MEAS - MED PEAK E' VEL: 6 CM/SEC
BH CV ECHO MEAS - MV A DUR: 0.14 SEC
BH CV ECHO MEAS - MV A MAX VEL: 53.8 CM/SEC
BH CV ECHO MEAS - MV DEC SLOPE: 322 CM/SEC^2
BH CV ECHO MEAS - MV DEC TIME: 0.21 SEC
BH CV ECHO MEAS - MV E MAX VEL: 68.6 CM/SEC
BH CV ECHO MEAS - MV E/A: 1.3
BH CV ECHO MEAS - MV MAX PG: 2.6 MMHG
BH CV ECHO MEAS - MV MEAN PG: 1 MMHG
BH CV ECHO MEAS - MV P1/2T MAX VEL: 80.8 CM/SEC
BH CV ECHO MEAS - MV P1/2T: 73.5 MSEC
BH CV ECHO MEAS - MV V2 MAX: 81 CM/SEC
BH CV ECHO MEAS - MV V2 MEAN: 32.2 CM/SEC
BH CV ECHO MEAS - MV V2 VTI: 29 CM
BH CV ECHO MEAS - MVA P1/2T LCG: 2.7 CM^2
BH CV ECHO MEAS - MVA(P1/2T): 3 CM^2
BH CV ECHO MEAS - MVA(VTI): 3.3 CM^2
BH CV ECHO MEAS - PA MAX PG (FULL): -0.01 MMHG
BH CV ECHO MEAS - PA MAX PG: 3.4 MMHG
BH CV ECHO MEAS - PA V2 MAX: 92.3 CM/SEC
BH CV ECHO MEAS - PULM A REVS DUR: 0.11 SEC
BH CV ECHO MEAS - PULM A REVS VEL: 16.8 CM/SEC
BH CV ECHO MEAS - PULM DIAS VEL: 35 CM/SEC
BH CV ECHO MEAS - PULM S/D: 1.5
BH CV ECHO MEAS - PULM SYS VEL: 53.3 CM/SEC
BH CV ECHO MEAS - PVA(V,A): 3.5 CM^2
BH CV ECHO MEAS - PVA(V,D): 3.5 CM^2
BH CV ECHO MEAS - QP/QS: 0.9
BH CV ECHO MEAS - RAP SYSTOLE: 3 MMHG
BH CV ECHO MEAS - RV MAX PG: 3.4 MMHG
BH CV ECHO MEAS - RV MEAN PG: 2 MMHG
BH CV ECHO MEAS - RV V1 MAX: 92.5 CM/SEC
BH CV ECHO MEAS - RV V1 MEAN: 68.4 CM/SEC
BH CV ECHO MEAS - RV V1 VTI: 24.6 CM
BH CV ECHO MEAS - RVOT AREA: 3.5 CM^2
BH CV ECHO MEAS - RVOT DIAM: 2.1 CM
BH CV ECHO MEAS - RVSP: 21 MMHG
BH CV ECHO MEAS - SI(AO): 97.7 ML/M^2
BH CV ECHO MEAS - SI(CUBED): 37.4 ML/M^2
BH CV ECHO MEAS - SI(LVOT): 48 ML/M^2
BH CV ECHO MEAS - SI(MOD-SP2): 26.3 ML/M^2
BH CV ECHO MEAS - SI(MOD-SP4): 22.8 ML/M^2
BH CV ECHO MEAS - SI(TEICH): 32.3 ML/M^2
BH CV ECHO MEAS - SUP REN AO DIAM: 2 CM
BH CV ECHO MEAS - SV(AO): 193.3 ML
BH CV ECHO MEAS - SV(CUBED): 74 ML
BH CV ECHO MEAS - SV(LVOT): 94.9 ML
BH CV ECHO MEAS - SV(MOD-SP2): 52 ML
BH CV ECHO MEAS - SV(MOD-SP4): 45 ML
BH CV ECHO MEAS - SV(RVOT): 85.2 ML
BH CV ECHO MEAS - SV(TEICH): 63.9 ML
BH CV ECHO MEAS - TAPSE (>1.6): 3.13 CM2
BH CV ECHO MEAS - TR MAX VEL: 214 CM/SEC
BH CV ECHO MEASUREMENTS AVERAGE E/E' RATIO: 8.07
BH CV VAS BP RIGHT ARM: NORMAL MMHG
BH CV XLRA - RV BASE: 2.49 CM
BH CV XLRA - TDI S': 16 CM/SEC
LEFT ATRIUM VOLUME INDEX: 33 ML/M2
MAXIMAL PREDICTED HEART RATE: 166 BPM
SINUS: 2.6 CM
STJ: 2.6 CM
STRESS TARGET HR: 141 BPM

## 2019-03-21 PROCEDURE — 93306 TTE W/DOPPLER COMPLETE: CPT | Performed by: INTERNAL MEDICINE

## 2019-03-21 PROCEDURE — 93306 TTE W/DOPPLER COMPLETE: CPT

## 2019-03-26 ENCOUNTER — HOSPITAL ENCOUNTER (OUTPATIENT)
Dept: PHYSICAL THERAPY | Facility: HOSPITAL | Age: 54
Setting detail: THERAPIES SERIES
Discharge: HOME | End: 2019-03-26

## 2019-03-26 DIAGNOSIS — M25.612 DECREASED RANGE OF MOTION OF LEFT SHOULDER: ICD-10-CM

## 2019-03-26 DIAGNOSIS — R29.898 WEAKNESS OF LEFT UPPER EXTREMITY: ICD-10-CM

## 2019-03-26 DIAGNOSIS — G89.29 CHRONIC LEFT SHOULDER PAIN: Primary | ICD-10-CM

## 2019-03-26 DIAGNOSIS — M25.512 CHRONIC LEFT SHOULDER PAIN: Primary | ICD-10-CM

## 2019-03-26 PROCEDURE — 97162 PT EVAL MOD COMPLEX 30 MIN: CPT | Performed by: PHYSICAL THERAPIST

## 2019-03-26 PROCEDURE — 97110 THERAPEUTIC EXERCISES: CPT | Performed by: PHYSICAL THERAPIST
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
## 2019-03-28 ENCOUNTER — HOSPITAL ENCOUNTER (OUTPATIENT)
Dept: PHYSICAL THERAPY | Facility: HOSPITAL | Age: 54
Setting detail: THERAPIES SERIES
Discharge: HOME | End: 2019-03-28

## 2019-03-28 DIAGNOSIS — R29.898 WEAKNESS OF LEFT UPPER EXTREMITY: ICD-10-CM

## 2019-03-28 DIAGNOSIS — M25.512 CHRONIC LEFT SHOULDER PAIN: Primary | ICD-10-CM

## 2019-03-28 DIAGNOSIS — M25.612 DECREASED RANGE OF MOTION OF LEFT SHOULDER: ICD-10-CM

## 2019-03-28 DIAGNOSIS — G89.29 CHRONIC LEFT SHOULDER PAIN: Primary | ICD-10-CM

## 2019-03-28 PROCEDURE — 97140 MANUAL THERAPY 1/> REGIONS: CPT | Performed by: PHYSICAL THERAPIST

## 2019-03-28 PROCEDURE — 97110 THERAPEUTIC EXERCISES: CPT | Performed by: PHYSICAL THERAPIST
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
## 2019-04-10 ENCOUNTER — HOSPITAL ENCOUNTER (OUTPATIENT)
Dept: PHYSICAL THERAPY | Facility: HOSPITAL | Age: 54
Setting detail: THERAPIES SERIES
Discharge: HOME | End: 2019-04-10

## 2019-04-10 DIAGNOSIS — R29.898 WEAKNESS OF LEFT UPPER EXTREMITY: ICD-10-CM

## 2019-04-10 DIAGNOSIS — M25.512 CHRONIC LEFT SHOULDER PAIN: Primary | ICD-10-CM

## 2019-04-10 DIAGNOSIS — M25.612 DECREASED RANGE OF MOTION OF LEFT SHOULDER: ICD-10-CM

## 2019-04-10 DIAGNOSIS — G89.29 CHRONIC LEFT SHOULDER PAIN: Primary | ICD-10-CM

## 2019-04-10 PROCEDURE — 97110 THERAPEUTIC EXERCISES: CPT | Performed by: PHYSICAL THERAPIST

## 2019-04-10 NOTE — THERAPY TREATMENT NOTE
Outpatient Physical Therapy Ortho Treatment Note  [facility]     Patient Name: Magali Schrader  : 1965  MRN: [id number]  Today's Date: 4/10/2019      Visit Date: 04/10/2019    Visit Dx:    ICD-10-CM ICD-9-CM   1. Chronic left shoulder pain M25.512 719.41    G89.29 338.29   2. Decreased range of motion of left shoulder M25.612 719.51   3. Weakness of left upper extremity R29.898 729.89       Patient Active Problem List   Diagnosis   • Hyperlipidemia   • Encounter for screening colonoscopy   • Closed nondisplaced fracture of lateral end of left clavicle        Past Medical History:   Diagnosis Date   • Hyperlipidemia    • Sinus bradycardia    • Sinusitis     history   • Ventricular pre-excitation         Past Surgical History:   Procedure Laterality Date   •  SECTION  2003   • COLONOSCOPY N/A 2017    Procedure: COLONOSCOPY to cecum;  Surgeon: Ashanti Harley MD;  Location: [facility] ENDOSCOPY;  Service:    • KNEE ARTHROSCOPY Right 2017    Procedure: RT KNEE ARTHROSCOPY, PARTIAL MEDIAL MENISECTOMY, DEBRIDEMENT OF ARTHRITIS;  Surgeon: Talia Niño MD;  Location: [facility] OR OSC;  Service:    • TONSILLECTOMY           PT Assessment/Plan     Row Name 04/10/19 1327          PT Assessment    Assessment Comments  Magali tolerated treatment well.  She requires cueing for exercise performance for technique.    -MP        PT Plan    PT Plan Comments  Monitor the effects of today's treatment, recheck IR strength and begin IR walkouts if significant weakness is found.  -MP       User Key  (r) = Recorded By, (t) = Taken By, (c) = Cosigned By    Initials Name Provider Type    Claude Sanchez, PT Physical Therapist            Exercises     Row Name 04/10/19 1300             Subjective Comments    Subjective Comments  Magali stated that she went for a long bike ride yesterday and has a mild burining discomfort in the anterior L shoulder.  -MP         Subjective Pain    Able to rate  subjective pain?  yes  -MP      Pre-Treatment Pain Level  1  -MP      Post-Treatment Pain Level  1  -MP         Total Minutes    06156 - PT Therapeutic Exercise Minutes  40  -MP      32793 - PT Manual Therapy Minutes  5  -MP         Exercise 1    Exercise Name 1  Refer to land flow sheet  -MP      Cueing 1  Verbal;Tactile  -MP      Additional Comments  She performed unilateral shoulder extension 7.5 lbs., supinated, on cable column, 10 x 2 B, scapulr row, supinated, cable column, 15 lbs. 10 x 2 and ER walk outs, cable column, 5 lbs. 10 x 2.  She required instruction for technique and reminders for scapular positioning.  She began a new exercises of shoulder wall slide for shoulder flexion and thoracic extension mobiity and in prone lower trap work with touchdown movement 10 x 2.  -MP        User Key  (r) = Recorded By, (t) = Taken By, (c) = Cosigned By    Initials Name Provider Type    MP Claude Elizabeth, PT Physical Therapist             Manual Rx (last 36 hours)      Manual Treatments     Row Name 04/10/19 1300             Total Minutes    04029 - PT Manual Therapy Minutes  5  -MP         Manual Rx 1    Manual Rx 1 Location  L shoulder  -MP      Manual Rx 1 Type  Passive stretching for ER, IR and flexion  -MP         Manual Rx 2    Manual Rx 2 Location  L GH joints  -MP      Manual Rx 2 Type  inferior, posterior and anterior glides  -MP      Manual Rx 2 Grade  3-4  -MP      Manual Rx 2 Duration  5 x 3 each  -MP        User Key  (r) = Recorded By, (t) = Taken By, (c) = Cosigned By    Initials Name Provider Type    Claude Sanchez, PT Physical Therapist          PT OP Goals     Row Name 04/10/19 1300          PT Short Term Goals    STG Date to Achieve  04/25/19  -MP     STG 1  Magali begins PREs for scapular retraction.  -MP     STG 1 Progress  Met  -MP     STG 2  She also is instructed in ER and IR walkouts with theraband.  -MP     STG 2 Progress  Partially Met  -MP     STG 3  Shoulder wall slide and active  elevation exercises are begun.  -MP     STG 3 Progress  Partially Met  -MP        Long Term Goals    LTG Date to Achieve  06/24/19  -MP     LTG 1  Functional AROM of the L shoulder is equal to the R and pain free.  -MP     LTG 1 Progress  Ongoing  -MP     LTG 2  MMT of the L shoulder, cardinal motions equals 4+ to 5/5.  -MP     LTG 2 Progress  Ongoing  -MP     LTG 3  Pain, L shoulder, peaks at 1/10 and occurs infrequently.  -MP     LTG 3 Progress  Ongoing  -MP     LTG 4  Magali is independent with a HEP and self care education.  -MP     LTG 4 Progress  Ongoing  -MP       User Key  (r) = Recorded By, (t) = Taken By, (c) = Cosigned By    Initials Name Provider Type    Claude Sanchez PT Physical Therapist          Therapy Education  Education Details: Shoulder wall slides were added to her HEP.  Given: HEP, Symptoms/condition management  Program: New  How Provided: Demonstration, Written  Provided to: Patient  Level of Understanding: Teach back education performed     Time Calculation:   Start Time: 1205  Stop Time: 1250  Time Calculation (min): 45 min  Therapy Charges for Today     Code Description Service Date Service Provider Modifiers Qty    00174829367  PT THER PROC EA 15 MIN 4/10/2019 Claude Elizabeth PT GP 3          Claude Elizabeth PT  4/10/2019

## 2019-04-12 ENCOUNTER — HOSPITAL ENCOUNTER (OUTPATIENT)
Dept: PHYSICAL THERAPY | Facility: HOSPITAL | Age: 54
Setting detail: THERAPIES SERIES
Discharge: HOME | End: 2019-04-12

## 2019-04-12 DIAGNOSIS — G89.29 CHRONIC LEFT SHOULDER PAIN: Primary | ICD-10-CM

## 2019-04-12 DIAGNOSIS — M25.512 CHRONIC LEFT SHOULDER PAIN: Primary | ICD-10-CM

## 2019-04-12 DIAGNOSIS — R29.898 WEAKNESS OF LEFT UPPER EXTREMITY: ICD-10-CM

## 2019-04-12 DIAGNOSIS — M25.612 DECREASED RANGE OF MOTION OF LEFT SHOULDER: ICD-10-CM

## 2019-04-12 PROCEDURE — 97110 THERAPEUTIC EXERCISES: CPT | Performed by: PHYSICAL THERAPIST

## 2019-04-12 NOTE — THERAPY TREATMENT NOTE
Outpatient Physical Therapy Ortho Treatment Note  [facility]     Patient Name: Magali Schrader  : 1965  MRN: [id number]  Today's Date: 2019      Visit Date: 2019    Visit Dx:    ICD-10-CM ICD-9-CM   1. Chronic left shoulder pain M25.512 719.41    G89.29 338.29   2. Decreased range of motion of left shoulder M25.612 719.51   3. Weakness of left upper extremity R29.898 729.89       Patient Active Problem List   Diagnosis   • Hyperlipidemia   • Encounter for screening colonoscopy   • Closed nondisplaced fracture of lateral end of left clavicle        Past Medical History:   Diagnosis Date   • Hyperlipidemia    • Sinus bradycardia    • Sinusitis     history   • Ventricular pre-excitation         Past Surgical History:   Procedure Laterality Date   •  SECTION  2003   • COLONOSCOPY N/A 2017    Procedure: COLONOSCOPY to cecum;  Surgeon: Ashanti Harley MD;  Location: [facility] ENDOSCOPY;  Service:    • KNEE ARTHROSCOPY Right 2017    Procedure: RT KNEE ARTHROSCOPY, PARTIAL MEDIAL MENISECTOMY, DEBRIDEMENT OF ARTHRITIS;  Surgeon: Talia Niño MD;  Location: [facility] OR OSC;  Service:    • TONSILLECTOMY         PT Assessment/Plan     Row Name 19 1448          PT Assessment    Assessment Comments  Magali progressed therapeutic exercises with over head press in scapular plane.  She tolerated treatment well.  -MP        PT Plan    PT Plan Comments  Continue progressing exercises and monotoring her response to keep pain decreasing and function improving in the L shoulder.  -MP       User Key  (r) = Recorded By, (t) = Taken By, (c) = Cosigned By    Initials Name Provider Type    Claude Sanchez, PT Physical Therapist            Exercises     Row Name 19 1400 19 1300          Subjective Comments    Subjective Comments  Magali reported that she is noticing no problems today following last visit's progression.  -MP  --        Subjective Pain    Able to rate  subjective pain?  yes  -MP  --     Pre-Treatment Pain Level  2  -MP  --        Total Minutes    71132 - PT Therapeutic Exercise Minutes  40  -MP  --     90690 - PT Manual Therapy Minutes  --  5  -MP        Exercise 1    Exercise Name 1  Refer to land flow sheet  -MP  --     Cueing 1  Verbal;Tactile  -MP  --     Additional Comments  Cues for exercise performance.  She began overhead press, 2.5 lbs. B, 10 x 2.  -MP  --       User Key  (r) = Recorded By, (t) = Taken By, (c) = Cosigned By    Initials Name Provider Type    MP Claude Elizabeth, PT Physical Therapist             Manual Rx (last 36 hours)      Manual Treatments     Row Name 04/12/19 1300             Total Minutes    00525 - PT Manual Therapy Minutes  5  -MP         Manual Rx 1    Manual Rx 1 Location  L shoulder  -MP      Manual Rx 1 Type  Passive stretching for ER, IR and flexion  -MP         Manual Rx 2    Manual Rx 2 Location  L GH joints  -MP      Manual Rx 2 Type  inferior, posterior and anterior glides  -MP      Manual Rx 2 Grade  3-4  -MP      Manual Rx 2 Duration  5 x 3 each  -MP        User Key  (r) = Recorded By, (t) = Taken By, (c) = Cosigned By    Initials Name Provider Type    MP Claude Elizabeth, PT Physical Therapist          PT OP Goals     Row Name 04/12/19 1400          PT Short Term Goals    STG Date to Achieve  04/25/19  -MP     STG 1  Magali begins PREs for scapular retraction.  -MP     STG 1 Progress  Met  -MP     STG 2  She also is instructed in ER and IR walkouts with theraband.  -MP     STG 2 Progress  Partially Met  -MP     STG 3  Shoulder wall slide and active elevation exercises are begun.  -MP     STG 3 Progress  Met  -MP        Long Term Goals    LTG Date to Achieve  06/24/19  -MP     LTG 1  Functional AROM of the L shoulder is equal to the R and pain free.  -MP     LTG 1 Progress  Ongoing  -MP     LTG 2  MMT of the L shoulder, cardinal motions equals 4+ to 5/5.  -MP     LTG 2 Progress  Ongoing  -MP     LTG 3  Pain, L shoulder,  peaks at 1/10 and occurs infrequently.  -MP     LTG 3 Progress  Ongoing  -MP     LTG 4  Magali is independent with a HEP and self care education.  -MP     LTG 4 Progress  Ongoing  -MP       User Key  (r) = Recorded By, (t) = Taken By, (c) = Cosigned By    Initials Name Provider Type    Claude Sanchez, PT Physical Therapist          Therapy Education  Education Details: She was progressed with her HEP with standing lat pulls and rows, green theraband issued.  Prone lower trapezius lifts were added to her HEP.  Given: HEP, Symptoms/condition management  Program: New  How Provided: Written  Provided to: Patient  Level of Understanding: Teach back education performed    Time Calculation:   Start Time: 1345  Stop Time: 1430  Time Calculation (min): 45 min  Therapy Charges for Today     Code Description Service Date Service Provider Modifiers Qty    28383883098 HC PT THER PROC EA 15 MIN 4/12/2019 Claude Elizabeth, PT GP 3          Claude Elizabeth PT  4/12/2019

## 2019-04-24 ENCOUNTER — HOSPITAL ENCOUNTER (OUTPATIENT)
Dept: PHYSICAL THERAPY | Facility: HOSPITAL | Age: 54
Setting detail: THERAPIES SERIES
Discharge: HOME | End: 2019-04-24

## 2019-04-24 DIAGNOSIS — R29.898 WEAKNESS OF LEFT UPPER EXTREMITY: ICD-10-CM

## 2019-04-24 DIAGNOSIS — G89.29 CHRONIC LEFT SHOULDER PAIN: Primary | ICD-10-CM

## 2019-04-24 DIAGNOSIS — M25.612 DECREASED RANGE OF MOTION OF LEFT SHOULDER: ICD-10-CM

## 2019-04-24 DIAGNOSIS — M25.512 CHRONIC LEFT SHOULDER PAIN: Primary | ICD-10-CM

## 2019-04-24 PROCEDURE — 97140 MANUAL THERAPY 1/> REGIONS: CPT | Performed by: PHYSICAL THERAPIST

## 2019-04-24 PROCEDURE — 97110 THERAPEUTIC EXERCISES: CPT | Performed by: PHYSICAL THERAPIST

## 2019-04-24 NOTE — THERAPY TREATMENT NOTE
Outpatient Physical Therapy Ortho Treatment Note  [facility]     Patient Name: Maagli Schrader  : 1965  MRN: [id number]  Today's Date: 2019      Visit Date: 2019    Visit Dx:    ICD-10-CM ICD-9-CM   1. Chronic left shoulder pain M25.512 719.41    G89.29 338.29   2. Decreased range of motion of left shoulder M25.612 719.51   3. Weakness of left upper extremity R29.898 729.89       Patient Active Problem List   Diagnosis   • Hyperlipidemia   • Encounter for screening colonoscopy   • Closed nondisplaced fracture of lateral end of left clavicle        Past Medical History:   Diagnosis Date   • Hyperlipidemia    • Sinus bradycardia    • Sinusitis     history   • Ventricular pre-excitation         Past Surgical History:   Procedure Laterality Date   •  SECTION  2003   • COLONOSCOPY N/A 2017    Procedure: COLONOSCOPY to cecum;  Surgeon: Ashanti Harley MD;  Location: [facility] ENDOSCOPY;  Service:    • KNEE ARTHROSCOPY Right 2017    Procedure: RT KNEE ARTHROSCOPY, PARTIAL MEDIAL MENISECTOMY, DEBRIDEMENT OF ARTHRITIS;  Surgeon: Talia Niño MD;  Location: [facility] OR OSC;  Service:    • TONSILLECTOMY         PT Assessment/Plan     Row Name 19 1348          PT Assessment    Assessment Comments  Magali tolerated treatment well.  She is slowly progressing therapeutic exercises that are aimed to improve L shoulder function.  -MP        PT Plan    PT Plan Comments  Continue as indicated, monitor the effects of today's treatment.  -MP       User Key  (r) = Recorded By, (t) = Taken By, (c) = Cosigned By    Initials Name Provider Type    Claude Sanchez, PT Physical Therapist            Exercises     Row Name 19 1300             Subjective Comments    Subjective Comments  Gre  -MP         Subjective Pain    Able to rate subjective pain?  yes  -MP      Pre-Treatment Pain Level  1  -MP      Post-Treatment Pain Level  0  -MP         Total Minutes    97330 - PT  Therapeutic Exercise Minutes  35  -MP      39009 - PT Manual Therapy Minutes  10  -MP         Exercise 1    Exercise Name 1  Refer to land flow sheet  -MP      Cueing 1  Verbal;Tactile  -MP      Additional Comments  Cues for exercise technique, in prone, pillow under belly and roll under forehead, she began scapular W, 10 x 2, B.  -MP        User Key  (r) = Recorded By, (t) = Taken By, (c) = Cosigned By    Initials Name Provider Type    MP Claude Elizabeth, PT Physical Therapist             Manual Rx (last 36 hours)      Manual Treatments     Row Name 04/24/19 1300             Total Minutes    39891 - PT Manual Therapy Minutes  10  -MP         Manual Rx 1    Manual Rx 1 Location  R shoulder  -MP      Manual Rx 1 Type  PROM for flexion, scaption, ER and IR  -MP         Manual Rx 2    Manual Rx 2 Location  R GH joint  -MP      Manual Rx 2 Type  inferior and posterior glides  -MP      Manual Rx 2 Grade  3-4  -MP      Manual Rx 2 Duration  5 x 3 each  -MP        User Key  (r) = Recorded By, (t) = Taken By, (c) = Cosigned By    Initials Name Provider Type    Claude Sanchez, PT Physical Therapist          PT OP Goals     Row Name 04/24/19 1300          PT Short Term Goals    STG Date to Achieve  04/25/19  -MP     STG 1  Magali begins PREs for scapular retraction.  -MP     STG 1 Progress  Met  -MP     STG 2  She also is instructed in ER and IR walkouts with theraband.  -MP     STG 2 Progress  Partially Met  -MP     STG 3  Shoulder wall slide and active elevation exercises are begun.  -MP     STG 3 Progress  Met  -MP        Long Term Goals    LTG Date to Achieve  06/24/19  -MP     LTG 1  Functional AROM of the L shoulder is equal to the R and pain free.  -MP     LTG 1 Progress  Ongoing  -MP     LTG 2  MMT of the L shoulder, cardinal motions equals 4+ to 5/5.  -MP     LTG 2 Progress  Ongoing  -MP     LTG 3  Pain, L shoulder, peaks at 1/10 and occurs infrequently.  -MP     LTG 3 Progress  Ongoing  -MP     LTG 4  Magali is  independent with a HEP and self care education.  -MP     LTG 4 Progress  Ongoing  -MP       User Key  (r) = Recorded By, (t) = Taken By, (c) = Cosigned By    Initials Name Provider Type    Claude Sanchez PT Physical Therapist          Therapy Education  Given: HEP, Symptoms/condition management  Program: Reinforced  How Provided: Verbal  Provided to: Patient  Level of Understanding: Verbalized     Time Calculation:   Start Time: 1205  Stop Time: 1250  Time Calculation (min): 45 min  Therapy Charges for Today     Code Description Service Date Service Provider Modifiers Qty    78026894309 HC PT THER PROC EA 15 MIN 4/24/2019 Claude Elizabeth, PT GP 2    87985918268 HC PT MANUAL THERAPY EA 15 MIN 4/24/2019 Claude Elizabeth, PT GP 1          Claude Elizabeth PT  4/24/2019

## 2019-04-26 ENCOUNTER — HOSPITAL ENCOUNTER (OUTPATIENT)
Dept: PHYSICAL THERAPY | Facility: HOSPITAL | Age: 54
Setting detail: THERAPIES SERIES
Discharge: HOME | End: 2019-04-26

## 2019-04-26 DIAGNOSIS — G89.29 CHRONIC LEFT SHOULDER PAIN: Primary | ICD-10-CM

## 2019-04-26 DIAGNOSIS — M25.512 CHRONIC LEFT SHOULDER PAIN: Primary | ICD-10-CM

## 2019-04-26 DIAGNOSIS — M25.612 DECREASED RANGE OF MOTION OF LEFT SHOULDER: ICD-10-CM

## 2019-04-26 DIAGNOSIS — R29.898 WEAKNESS OF LEFT UPPER EXTREMITY: ICD-10-CM

## 2019-04-26 PROCEDURE — 97110 THERAPEUTIC EXERCISES: CPT | Performed by: PHYSICAL THERAPIST

## 2019-04-26 NOTE — THERAPY PROGRESS REPORT/RE-CERT
Outpatient Physical Therapy Ortho Re-Assessment  [facility]     Patient Name: Magali Schrader  : 1965  MRN: [id number]  Today's Date: 2019      Visit Date: 2019    Patient Active Problem List   Diagnosis   • Hyperlipidemia   • Encounter for screening colonoscopy   • Closed nondisplaced fracture of lateral end of left clavicle        Past Medical History:   Diagnosis Date   • Hyperlipidemia    • Sinus bradycardia    • Sinusitis     history   • Ventricular pre-excitation         Past Surgical History:   Procedure Laterality Date   •  SECTION  2003   • COLONOSCOPY N/A 2017    Procedure: COLONOSCOPY to cecum;  Surgeon: Ashanti Harley MD;  Location: [facility] ENDOSCOPY;  Service:    • KNEE ARTHROSCOPY Right 2017    Procedure: RT KNEE ARTHROSCOPY, PARTIAL MEDIAL MENISECTOMY, DEBRIDEMENT OF ARTHRITIS;  Surgeon: Talia Niño MD;  Location: [facility] OR OSC;  Service:    • TONSILLECTOMY         Visit Dx:     ICD-10-CM ICD-9-CM   1. Chronic left shoulder pain M25.512 719.41    G89.29 338.29   2. Decreased range of motion of left shoulder M25.612 719.51   3. Weakness of left upper extremity R29.898 729.89       PT Ortho     Row Name 19 1700       General ROM    GENERAL ROM COMMENTS  Functional AROM of the L shoulder, elevation in the scapular plane, 162 degrees, reach behind the neck T1 spinous process and reach behind the back T6 spinous process.  She noticed tightness at end range of elevation.    -MP       MMT (Manual Muscle Testing)    General MMT Comments  L shoulder, ER 4-/5, IR 4+/5, flexion 4 to 4+/5, abduction 4+/5 and extension 4+/5  -MP      User Key  (r) = Recorded By, (t) = Taken By, (c) = Cosigned By    Initials Name Provider Type    Claude Sanchez, PT Physical Therapist          Therapy Education  Given: HEP, Symptoms/condition management  Program: Reinforced  How Provided: Verbal  Provided to: Patient  Level of Understanding: Verbalized     PT OP  Goals     Row Name 04/26/19 1700          PT Short Term Goals    STG Date to Achieve  04/25/19  -MP     STG 1  Magali begins PREs for scapular retraction.  -MP     STG 1 Progress  Met  -MP     STG 2  She also is instructed in ER and IR walkouts with theraband.  -MP     STG 2 Progress  Met  -MP     STG 3  Shoulder wall slide and active elevation exercises are begun.  -MP     STG 3 Progress  Met  -MP        Long Term Goals    LTG Date to Achieve  06/24/19  -MP     LTG 1  Functional AROM of the L shoulder is equal to the R and pain free.  -MP     LTG 1 Progress  Ongoing  -MP     LTG 2  MMT of the L shoulder, cardinal motions equals 4+ to 5/5.  -MP     LTG 2 Progress  Ongoing  -MP     LTG 3  Pain, L shoulder, peaks at 1/10 and occurs infrequently.  -MP     LTG 3 Progress  Ongoing  -MP     LTG 4  Magali is independent with a HEP and self care education.  -MP     LTG 4 Progress  Ongoing  -MP       User Key  (r) = Recorded By, (t) = Taken By, (c) = Cosigned By    Initials Name Provider Type    Claude Sanchez, PT Physical Therapist          PT Assessment/Plan     Row Name 04/26/19 1192          PT Assessment    Functional Limitations  Limitations in functional capacity and performance;Performance in sport activities;Performance in leisure activities  -MP     Impairments  Range of motion;Muscle strength;Impaired flexibility;Impaired muscle endurance;Endurance;Posture;Impaired muscle power  -MP     Assessment Comments  Magali Schrader has been seen for six skilled physical therapy sessions for left shoulder pain.  Treatment has included manual therapy, therapeutic exercises and education for self care. Progress to physical therapy goals is gradual.  She will benefit from continued skilled physical therapy to address remaining impairments, functional limitations and preparing her for ongoing independent self care.  -MP     Please refer to paper survey for additional self-reported information  No  -MP     Rehab Potential   Good  -MP     Patient/caregiver participated in establishment of treatment plan and goals  Yes  -MP     Patient would benefit from skilled therapy intervention  Yes  -MP        PT Plan    PT Frequency  -- 1-2 x per week  -MP     Predicted Duration of Therapy Intervention (Therapy Eval)  4 weeks  -MP     Planned CPT's?  PT EVAL MOD COMPLELITY: 94908;PT RE-EVAL: 41436;PT THER PROC EA 15 MIN: 46440;PT MANUAL THERAPY EA 15 MIN: 17414;PT SELF CARE/HOME MGMT/TRAIN EA 15: 91471;PT ELECTRICAL STIM UNATTEND: ;PT ULTRASOUND EA 15 MIN: 08841;PT THER ACT EA 15 MIN: 59377  -MP     PT Plan Comments  Continue with education in the prone scapular activities and progress rotator cuff strengthening as well.  -MP       User Key  (r) = Recorded By, (t) = Taken By, (c) = Cosigned By    Initials Name Provider Type    Claude Sanchez, PT Physical Therapist            Exercises     Row Name 04/26/19 1700             Subjective Comments    Subjective Comments  Magali delaney is generally noticing less pain and better ROM of the L shoulder since beginning PT.  She picked up a back pack from the ground several days ago and noticed a sharp pain in the L shouder at 3-4/10 but generally pain has been better.  She noticed a few weeks ago that swimming was also better.  -MP         Subjective Pain    Able to rate subjective pain?  yes  -MP      Pre-Treatment Pain Level  0  -MP      Post-Treatment Pain Level  0  -MP         Total Minutes    21978 - PT Therapeutic Exercise Minutes  40  -MP      20858 - PT Manual Therapy Minutes  5  -MP         Exercise 1    Exercise Name 1  Refer to land flow sheet  -MP      Cueing 1  Verbal;Tactile  -MP      Additional Comments  Cues for positioning of her arms during prone scapular exercises for depression retraction with arms at W, then T and finally lower trap Y.  She did 10 x 2 of each  -MP        User Key  (r) = Recorded By, (t) = Taken By, (c) = Cosigned By    Initials Name Provider Type    WAI Elizabeth  Claude PT Physical Therapist           Manual Rx (last 36 hours)      Manual Treatments     Row Name 04/26/19 1700             Total Minutes    59596 - PT Manual Therapy Minutes  5  -MP         Manual Rx 1    Manual Rx 1 Location  L GH joint  -MP      Manual Rx 1 Type  inferior and posterior glides  -MP      Manual Rx 1 Grade  3-4  -MP        User Key  (r) = Recorded By, (t) = Taken By, (c) = Cosigned By    Initials Name Provider Type    Claude Sanchez, ELVIA Physical Therapist           Time Calculation:     Start Time: 1600  Stop Time: 1645  Time Calculation (min): 45 min     Therapy Charges for Today     Code Description Service Date Service Provider Modifiers Qty    74051591126 HC PT THER PROC EA 15 MIN 4/26/2019 Claude Elizabeth, PT GP 3          Claude Elizabeth PT  4/26/2019

## 2019-04-29 ENCOUNTER — HOSPITAL ENCOUNTER (OUTPATIENT)
Dept: PHYSICAL THERAPY | Facility: HOSPITAL | Age: 54
Setting detail: THERAPIES SERIES
Discharge: HOME | End: 2019-04-29

## 2019-04-29 DIAGNOSIS — G89.29 CHRONIC LEFT SHOULDER PAIN: Primary | ICD-10-CM

## 2019-04-29 DIAGNOSIS — R29.898 WEAKNESS OF LEFT UPPER EXTREMITY: ICD-10-CM

## 2019-04-29 DIAGNOSIS — M25.612 DECREASED RANGE OF MOTION OF LEFT SHOULDER: ICD-10-CM

## 2019-04-29 DIAGNOSIS — M25.512 CHRONIC LEFT SHOULDER PAIN: Primary | ICD-10-CM

## 2019-04-29 PROCEDURE — 97110 THERAPEUTIC EXERCISES: CPT | Performed by: PHYSICAL THERAPIST

## 2019-04-29 NOTE — THERAPY TREATMENT NOTE
Outpatient Physical Therapy Ortho Treatment Note  [facility]     Patient Name: Magali Schrader  : 1965  MRN: [id number]  Today's Date: 2019      Visit Date: 2019    Visit Dx:    ICD-10-CM ICD-9-CM   1. Chronic left shoulder pain M25.512 719.41    G89.29 338.29   2. Decreased range of motion of left shoulder M25.612 719.51   3. Weakness of left upper extremity R29.898 729.89       Patient Active Problem List   Diagnosis   • Hyperlipidemia   • Encounter for screening colonoscopy   • Closed nondisplaced fracture of lateral end of left clavicle        Past Medical History:   Diagnosis Date   • Hyperlipidemia    • Sinus bradycardia    • Sinusitis     history   • Ventricular pre-excitation         Past Surgical History:   Procedure Laterality Date   •  SECTION  2003   • COLONOSCOPY N/A 2017    Procedure: COLONOSCOPY to cecum;  Surgeon: Ashanti Harley MD;  Location: [facility] ENDOSCOPY;  Service:    • KNEE ARTHROSCOPY Right 2017    Procedure: RT KNEE ARTHROSCOPY, PARTIAL MEDIAL MENISECTOMY, DEBRIDEMENT OF ARTHRITIS;  Surgeon: Talia Niño MD;  Location: [facility] OR OSC;  Service:    • TONSILLECTOMY         PT Ortho     Row Name 19 1700       General ROM    GENERAL ROM COMMENTS  Functional AROM of the L shoulder, elevation in the scapular plane, 162 degrees, reach behind the neck T1 spinous process and reach behind the back T6 spinous process.  She noticed tightness at end range of elevation.    -MP       MMT (Manual Muscle Testing)    General MMT Comments  L shoulder, ER 4-/5, IR 4+/5, flexion 4 to 4+/5, abduction 4+/5 and extension 4+/5  -MP      User Key  (r) = Recorded By, (t) = Taken By, (c) = Cosigned By    Initials Name Provider Type    MP Claude Elizabeth, PT Physical Therapist          PT Assessment/Plan     Row Name 19 4906          PT Assessment    Assessment Comments  Magali tolerated treatment well.  She is progressing with her exercises and  noticing better function with ADLs and general exercises with the L UE.  -MP        PT Plan    PT Plan Comments  Continue as indicated.  -MP       User Key  (r) = Recorded By, (t) = Taken By, (c) = Cosigned By    Initials Name Provider Type    Claude Sanchez, PT Physical Therapist            Exercises     Row Name 04/29/19 1500             Subjective Comments    Subjective Comments  Magali stated that she is noticing better motion and greater ease with exercises she does for fitness involving her L shoulder.  -MP         Subjective Pain    Able to rate subjective pain?  yes  -MP      Pre-Treatment Pain Level  0  -MP      Post-Treatment Pain Level  0  -MP         Total Minutes    57461 - PT Therapeutic Exercise Minutes  40  -MP      71298 - PT Manual Therapy Minutes  5  -MP         Exercise 1    Exercise Name 1  Refer to land flow sheet  -MP      Cueing 1  Verbal;Tactile  -MP      Additional Comments  Cues for exercise technique  -MP        User Key  (r) = Recorded By, (t) = Taken By, (c) = Cosigned By    Initials Name Provider Type    Claude Sanchez, PT Physical Therapist             Manual Rx (last 36 hours)      Manual Treatments     Row Name 04/29/19 1500             Total Minutes    01874 - PT Manual Therapy Minutes  5  -MP         Manual Rx 1    Manual Rx 1 Location  L GH joint  -MP      Manual Rx 1 Type  inferior and posterior glides  -MP      Manual Rx 1 Grade  3-4  -MP        User Key  (r) = Recorded By, (t) = Taken By, (c) = Cosigned By    Initials Name Provider Type    Claude Sanchez, PT Physical Therapist          PT OP Goals     Row Name 04/29/19 1500          PT Short Term Goals    STG Date to Achieve  04/25/19  -MP     STG 1  Magali begins PREs for scapular retraction.  -MP     STG 1 Progress  Met  -MP     STG 2  She also is instructed in ER and IR walkouts with theraband.  -MP     STG 2 Progress  Met  -MP     STG 3  Shoulder wall slide and active elevation exercises are begun.  -MP     STG 3  Progress  Met  -MP        Long Term Goals    LTG Date to Achieve  06/24/19  -MP     LTG 1  Functional AROM of the L shoulder is equal to the R and pain free.  -MP     LTG 1 Progress  Ongoing  -MP     LTG 2  MMT of the L shoulder, cardinal motions equals 4+ to 5/5.  -MP     LTG 2 Progress  Ongoing  -MP     LTG 3  Pain, L shoulder, peaks at 1/10 and occurs infrequently.  -MP     LTG 3 Progress  Ongoing  -MP     LTG 4  Magali is independent with a HEP and self care education.  -MP     LTG 4 Progress  Ongoing  -MP       User Key  (r) = Recorded By, (t) = Taken By, (c) = Cosigned By    Initials Name Provider Type    Claude Sanchez PT Physical Therapist          Therapy Education  Given: HEP, Symptoms/condition management  Program: Reinforced  How Provided: Verbal  Provided to: Patient  Level of Understanding: Verbalized     Time Calculation:   Start Time: 1420  Stop Time: 1515  Time Calculation (min): 55 min  Therapy Charges for Today     Code Description Service Date Service Provider Modifiers Qty    74286429140  PT THER PROC EA 15 MIN 4/29/2019 Claude Elizabeth PT GP 3          Claude Elizabeth PT  4/29/2019

## 2019-05-03 ENCOUNTER — HOSPITAL ENCOUNTER (OUTPATIENT)
Dept: PHYSICAL THERAPY | Facility: HOSPITAL | Age: 54
Setting detail: THERAPIES SERIES
Discharge: HOME | End: 2019-05-03

## 2019-05-03 DIAGNOSIS — M25.612 DECREASED RANGE OF MOTION OF LEFT SHOULDER: ICD-10-CM

## 2019-05-03 DIAGNOSIS — M25.512 CHRONIC LEFT SHOULDER PAIN: Primary | ICD-10-CM

## 2019-05-03 DIAGNOSIS — R29.898 WEAKNESS OF LEFT UPPER EXTREMITY: ICD-10-CM

## 2019-05-03 DIAGNOSIS — G89.29 CHRONIC LEFT SHOULDER PAIN: Primary | ICD-10-CM

## 2019-05-03 PROCEDURE — 97110 THERAPEUTIC EXERCISES: CPT | Performed by: PHYSICAL THERAPIST

## 2019-05-03 NOTE — THERAPY TREATMENT NOTE
Outpatient Physical Therapy Ortho Treatment Note  [facility]     Patient Name: Magali Schrader  : 1965  MRN: [id number]  Today's Date: 5/3/2019      Visit Date: 2019    Visit Dx:    ICD-10-CM ICD-9-CM   1. Chronic left shoulder pain M25.512 719.41    G89.29 338.29   2. Decreased range of motion of left shoulder M25.612 719.51   3. Weakness of left upper extremity R29.898 729.89       Patient Active Problem List   Diagnosis   • Hyperlipidemia   • Encounter for screening colonoscopy   • Closed nondisplaced fracture of lateral end of left clavicle        Past Medical History:   Diagnosis Date   • Hyperlipidemia    • Sinus bradycardia    • Sinusitis     history   • Ventricular pre-excitation         Past Surgical History:   Procedure Laterality Date   •  SECTION  2003   • COLONOSCOPY N/A 2017    Procedure: COLONOSCOPY to cecum;  Surgeon: Ashanti Harley MD;  Location: [facility] ENDOSCOPY;  Service:    • KNEE ARTHROSCOPY Right 2017    Procedure: RT KNEE ARTHROSCOPY, PARTIAL MEDIAL MENISECTOMY, DEBRIDEMENT OF ARTHRITIS;  Surgeon: Talia Niño MD;  Location: [facility] OR OSC;  Service:    • TONSILLECTOMY         PT Assessment/Plan     Row Name 19 1331          PT Assessment    Assessment Comments  Magali noticed less stiffness after treatment.  She needs cueing for spinal positioning and support when doing her PREs.  -MP        PT Plan    PT Plan Comments  Continue progressing exercises and working on her spine positioning/support during exercise.  -MP       User Key  (r) = Recorded By, (t) = Taken By, (c) = Cosigned By    Initials Name Provider Type    Claude Sanchez, PT Physical Therapist          Exercises     Row Name 19 1300             Subjective Comments    Subjective Comments  Magali reports doing a variety of swim strokes a day or so ago and she was noticing L anterior/medial shoulder pain with a specific stroke, Tarzan, that went away after she  stopped.    -MP         Subjective Pain    Able to rate subjective pain?  yes  -MP      Pre-Treatment Pain Level  1  -MP      Post-Treatment Pain Level  0  -MP      Subjective Pain Comment  Pre treatment stiffness was gone after treatment.  -MP         Total Minutes    58900 - PT Therapeutic Exercise Minutes  40  -MP      48682 - PT Manual Therapy Minutes  5  -MP         Exercise 1    Exercise Name 1  Refer to land flow sheet  -MP      Cueing 1  Verbal;Tactile  -MP      Additional Comments  Cues for exercise technique, specifically positioning the neck in neutral position for over head press and for low back support with the core musculature.  -MP        User Key  (r) = Recorded By, (t) = Taken By, (c) = Cosigned By    Initials Name Provider Type    Claude Sanchez, PT Physical Therapist             Manual Rx (last 36 hours)      Manual Treatments     Row Name 05/03/19 1300             Total Minutes    52094 - PT Manual Therapy Minutes  5  -MP         Manual Rx 1    Manual Rx 1 Location  L GH joint  -MP      Manual Rx 1 Type  inferior and posterior glides  -MP      Manual Rx 1 Grade  3-4  -MP        User Key  (r) = Recorded By, (t) = Taken By, (c) = Cosigned By    Initials Name Provider Type    Claude Sanchez, PT Physical Therapist          PT OP Goals     Row Name 05/03/19 1300          PT Short Term Goals    STG Date to Achieve  04/25/19  -MP     STG 1  Magali begins PREs for scapular retraction.  -MP     STG 1 Progress  Met  -MP     STG 2  She also is instructed in ER and IR walkouts with theraband.  -MP     STG 2 Progress  Met  -MP     STG 3  Shoulder wall slide and active elevation exercises are begun.  -MP     STG 3 Progress  Met  -MP        Long Term Goals    LTG Date to Achieve  06/24/19  -MP     LTG 1  Functional AROM of the L shoulder is equal to the R and pain free.  -MP     LTG 1 Progress  Ongoing  -MP     LTG 2  MMT of the L shoulder, cardinal motions equals 4+ to 5/5.  -MP     LTG 2 Progress   Ongoing  -MP     LTG 3  Pain, L shoulder, peaks at 1/10 and occurs infrequently.  -MP     LTG 3 Progress  Ongoing  -MP     LTG 4  Magali is independent with a HEP and self care education.  -MP     LTG 4 Progress  Ongoing  -MP       User Key  (r) = Recorded By, (t) = Taken By, (c) = Cosigned By    Initials Name Provider Type    Claude Sanchez, ELVIA Physical Therapist          Therapy Education  Given: HEP, Symptoms/condition management  Program: Reinforced  How Provided: Verbal  Provided to: Patient  Level of Understanding: Verbalized    Time Calculation:   Start Time: 1120  Stop Time: 1205  Time Calculation (min): 45 min  Therapy Charges for Today     Code Description Service Date Service Provider Modifiers Qty    85636197303 HC PT THER PROC EA 15 MIN 5/3/2019 Claude Elizabeth, PT GP 3          Claude Elizabeth PT  5/3/2019

## 2019-05-06 ENCOUNTER — HOSPITAL ENCOUNTER (OUTPATIENT)
Dept: PHYSICAL THERAPY | Facility: HOSPITAL | Age: 54
Setting detail: THERAPIES SERIES
Discharge: HOME | End: 2019-05-06

## 2019-05-06 DIAGNOSIS — M25.512 CHRONIC LEFT SHOULDER PAIN: Primary | ICD-10-CM

## 2019-05-06 DIAGNOSIS — M25.612 DECREASED RANGE OF MOTION OF LEFT SHOULDER: ICD-10-CM

## 2019-05-06 DIAGNOSIS — G89.29 CHRONIC LEFT SHOULDER PAIN: Primary | ICD-10-CM

## 2019-05-06 DIAGNOSIS — R29.898 WEAKNESS OF LEFT UPPER EXTREMITY: ICD-10-CM

## 2019-05-06 PROCEDURE — 97110 THERAPEUTIC EXERCISES: CPT | Performed by: PHYSICAL THERAPIST

## 2019-05-06 NOTE — THERAPY TREATMENT NOTE
Outpatient Physical Therapy Ortho Treatment Note  [facility]     Patient Name: Magali Schrader  : 1965  MRN: [id number]  Today's Date: 2019      Visit Date: 2019    Visit Dx:    ICD-10-CM ICD-9-CM   1. Chronic left shoulder pain M25.512 719.41    G89.29 338.29   2. Decreased range of motion of left shoulder M25.612 719.51   3. Weakness of left upper extremity R29.898 729.89       Patient Active Problem List   Diagnosis   • Hyperlipidemia   • Encounter for screening colonoscopy   • Closed nondisplaced fracture of lateral end of left clavicle        Past Medical History:   Diagnosis Date   • Hyperlipidemia    • Sinus bradycardia    • Sinusitis     history   • Ventricular pre-excitation         Past Surgical History:   Procedure Laterality Date   •  SECTION  2003   • COLONOSCOPY N/A 2017    Procedure: COLONOSCOPY to cecum;  Surgeon: Ashanti Harley MD;  Location: [facility] ENDOSCOPY;  Service:    • KNEE ARTHROSCOPY Right 2017    Procedure: RT KNEE ARTHROSCOPY, PARTIAL MEDIAL MENISECTOMY, DEBRIDEMENT OF ARTHRITIS;  Surgeon: Talia Niño MD;  Location: [facility] OR OSC;  Service:    • TONSILLECTOMY         PT Assessment/Plan     Row Name 19 0612          PT Assessment    Assessment Comments  Magali tolerated treatment well.  She is progressing slowly and tolerated both the swimming and PT session right afterwards without great difficulty.    -MP        PT Plan    PT Plan Comments  Monitor the effect of today's session and progress accordingly.  -MP       User Key  (r) = Recorded By, (t) = Taken By, (c) = Cosigned By    Initials Name Provider Type    Claude Sanchez, PT Physical Therapist            Exercises     Row Name 19 8240             Subjective Comments    Subjective Comments  Magali reported that she has already done some swimming today but did not do the Tarzan stroke.  -MP         Subjective Pain    Able to rate subjective pain?  yes  -MP       Pre-Treatment Pain Level  0  -MP      Post-Treatment Pain Level  0  -MP         Total Minutes    40453 - PT Therapeutic Exercise Minutes  40  -MP      41648 - PT Manual Therapy Minutes  5  -MP         Exercise 1    Exercise Name 1  Refer to land flow sheet  -MP      Cueing 1  Verbal;Tactile  -MP      Additional Comments  Cues for exercise technique  -MP        User Key  (r) = Recorded By, (t) = Taken By, (c) = Cosigned By    Initials Name Provider Type    MP Claude Elizabeth, PT Physical Therapist             Manual Rx (last 36 hours)      Manual Treatments     Row Name 05/06/19 1700             Total Minutes    97282 - PT Manual Therapy Minutes  5  -MP         Manual Rx 1    Manual Rx 1 Location  L GH joint  -MP      Manual Rx 1 Type  inferior and posterior glides  -MP      Manual Rx 1 Grade  3-4  -MP        User Key  (r) = Recorded By, (t) = Taken By, (c) = Cosigned By    Initials Name Provider Type    MP Claude Elizabeth, PT Physical Therapist          PT OP Goals     Row Name 05/06/19 1700          PT Short Term Goals    STG Date to Achieve  04/25/19  -MP     STG 1  Magali begins PREs for scapular retraction.  -MP     STG 1 Progress  Met  -MP     STG 2  She also is instructed in ER and IR walkouts with theraband.  -MP     STG 2 Progress  Met  -MP     STG 3  Shoulder wall slide and active elevation exercises are begun.  -MP     STG 3 Progress  Met  -MP        Long Term Goals    LTG Date to Achieve  06/24/19  -MP     LTG 1  Functional AROM of the L shoulder is equal to the R and pain free.  -MP     LTG 1 Progress  Ongoing  -MP     LTG 2  MMT of the L shoulder, cardinal motions equals 4+ to 5/5.  -MP     LTG 2 Progress  Ongoing  -MP     LTG 3  Pain, L shoulder, peaks at 1/10 and occurs infrequently.  -MP     LTG 3 Progress  Ongoing  -MP     LTG 4  Magali is independent with a HEP and self care education.  -MP     LTG 4 Progress  Ongoing  -MP       User Key  (r) = Recorded By, (t) = Taken By, (c) = Cosigned By     Initials Name Provider Type    Claude Sanchez PT Physical Therapist          Therapy Education  Given: HEP, Symptoms/condition management  Program: Reinforced  How Provided: Verbal  Provided to: Patient  Level of Understanding: Verbalized     Time Calculation:   Start Time: 1200  Stop Time: 1245  Time Calculation (min): 45 min  Therapy Charges for Today     Code Description Service Date Service Provider Modifiers Qty    19589040429 HC PT THER PROC EA 15 MIN 5/6/2019 Claude Elizabeth, ELVIA GP 3          Claude Elizabeth PT  5/6/2019

## 2019-05-10 ENCOUNTER — HOSPITAL ENCOUNTER (OUTPATIENT)
Dept: PHYSICAL THERAPY | Facility: HOSPITAL | Age: 54
Setting detail: THERAPIES SERIES
Discharge: HOME | End: 2019-05-10

## 2019-05-10 DIAGNOSIS — R29.898 WEAKNESS OF LEFT UPPER EXTREMITY: ICD-10-CM

## 2019-05-10 DIAGNOSIS — M25.612 DECREASED RANGE OF MOTION OF LEFT SHOULDER: ICD-10-CM

## 2019-05-10 DIAGNOSIS — M25.512 CHRONIC LEFT SHOULDER PAIN: Primary | ICD-10-CM

## 2019-05-10 DIAGNOSIS — G89.29 CHRONIC LEFT SHOULDER PAIN: Primary | ICD-10-CM

## 2019-05-10 PROCEDURE — 97110 THERAPEUTIC EXERCISES: CPT | Performed by: PHYSICAL THERAPIST

## 2019-05-10 NOTE — THERAPY PROGRESS REPORT/RE-CERT
Outpatient Physical Therapy Ortho Re-Assessment and Treatment Note  [facility]     Patient Name: Magali Schrader  : 1965  MRN: [id number]  Today's Date: 5/10/2019      Visit Date: 05/10/2019    Patient Active Problem List   Diagnosis   • Hyperlipidemia   • Encounter for screening colonoscopy   • Closed nondisplaced fracture of lateral end of left clavicle        Past Medical History:   Diagnosis Date   • Hyperlipidemia    • Sinus bradycardia    • Sinusitis     history   • Ventricular pre-excitation         Past Surgical History:   Procedure Laterality Date   •  SECTION  2003   • COLONOSCOPY N/A 2017    Procedure: COLONOSCOPY to cecum;  Surgeon: Ashanti Harley MD;  Location: [facility] ENDOSCOPY;  Service:    • KNEE ARTHROSCOPY Right 2017    Procedure: RT KNEE ARTHROSCOPY, PARTIAL MEDIAL MENISECTOMY, DEBRIDEMENT OF ARTHRITIS;  Surgeon: Talia Niño MD;  Location: [facility] OR OSC;  Service:    • TONSILLECTOMY         Visit Dx:     ICD-10-CM ICD-9-CM   1. Chronic left shoulder pain M25.512 719.41    G89.29 338.29   2. Decreased range of motion of left shoulder M25.612 719.51   3. Weakness of left upper extremity R29.898 729.89       PT Ortho     Row Name 05/10/19 1300       General ROM    GENERAL ROM COMMENTS  Functional AROM of the L shoulder, elevation in the scapular plane 0-170 degrees, reach behind the neck T2 spinous process and reach behind the back T6 spinous processs.    -MP       MMT (Manual Muscle Testing)    General MMT Comments  MMT of the L shoulder flexion 5/5, abduction 4+/5, ER 4+/5, IR 5/5 and extension 5/5.    -MP      User Key  (r) = Recorded By, (t) = Taken By, (c) = Cosigned By    Initials Name Provider Type    Claude Sanchez, PT Physical Therapist        Therapy Education  Education Details: A written explanation of 4 way prone scapular movements were issued and a wellness center sheet with her exercises she has been doing and with the seated lat pulls  and seated chest press added in.  Given: HEP  Program: New  How Provided: Written, Demonstration  Provided to: Patient  Level of Understanding: Teach back education performed     PT OP Goals     Row Name 05/10/19 1300          PT Short Term Goals    STG Date to Achieve  04/25/19  -MP     STG 1  Magali begins PREs for scapular retraction.  -MP     STG 1 Progress  Met  -MP     STG 2  She also is instructed in ER and IR walkouts with theraband.  -MP     STG 2 Progress  Met  -MP     STG 3  Shoulder wall slide and active elevation exercises are begun.  -MP     STG 3 Progress  Met  -MP        Long Term Goals    LTG Date to Achieve  06/24/19  -MP     LTG 1  Functional AROM of the L shoulder is equal to the R and pain free.  -MP     LTG 1 Progress  Met  -MP     LTG 2  MMT of the L shoulder, cardinal motions equals 4+ to 5/5.  -MP     LTG 2 Progress  Ongoing  -MP     LTG 3  Pain, L shoulder, peaks at 1/10 and occurs infrequently.  -MP     LTG 3 Progress  Ongoing  -MP     LTG 4  Magali is independent with a HEP and self care education.  -MP     LTG 4 Progress  Met  -MP       User Key  (r) = Recorded By, (t) = Taken By, (c) = Cosigned By    Initials Name Provider Type    Claude Sanchez, PT Physical Therapist          PT Assessment/Plan     Row Name 05/10/19 1318          PT Assessment    Functional Limitations  Limitations in functional capacity and performance  -MP     Impairments  Pain;Muscle strength;Endurance;Posture  -MP     Assessment Comments  Magali Schrader has been seen for ten skilled physical therapy sessions for left shoulder pain and dysfunction.  Treatment has included therapeutic exercise, manual therapy and education for self care. Progress to physical therapy goals is steady.  She is going to try and work on the left shoulder independently for two-three weeks and if no problems will be discharged at that time.  -MP     Please refer to paper survey for additional self-reported information  No  -MP      Rehab Potential  Good  -MP     Patient/caregiver participated in establishment of treatment plan and goals  Yes  -MP     Patient would benefit from skilled therapy intervention  Yes  -MP        PT Plan    Predicted Duration of Therapy Intervention (Therapy Eval)  She may be seen if problems arise in the next few weeks, 2 visits should be the extent, if needed.  -MP     Planned CPT's?  PT EVAL MOD COMPLELITY: 32807;PT RE-EVAL: 04759;PT THER ACT EA 15 MIN: 62163;PT MANUAL THERAPY EA 15 MIN: 18974;PT ELECTRICAL STIM UNATTEND: ;PT ULTRASOUND EA 15 MIN: 47428  -MP     PT Plan Comments  She is to check back in around 2 weeks or so and will either discharge or check problems that may arise.  -MP       User Key  (r) = Recorded By, (t) = Taken By, (c) = Cosigned By    Initials Name Provider Type    Claude Sanchez PT Physical Therapist            Exercises     Row Name 05/10/19 1300             Subjective Comments    Subjective Comments  Magali reported that she feels ready to work on her L shoulder independently.    -MP         Subjective Pain    Able to rate subjective pain?  yes  -MP      Pre-Treatment Pain Level  0  -MP      Post-Treatment Pain Level  0  -MP         Total Minutes    26779 - PT Therapeutic Exercise Minutes  45  -MP         Exercise 1    Exercise Name 1  Review of all previous exercises in the gym and with the 4 way prone scapular exercises.  She was also introduced to the lat pulls, supinated , seated, 40 lbs. 10 x 2 and seated chest press, 25 lbs. 10 x 2.  -MP      Cueing 1  Verbal;Tactile  -MP      Additional Comments  Cues for exercise technique.  -MP        User Key  (r) = Recorded By, (t) = Taken By, (c) = Cosigned By    Initials Name Provider Type    Claude Sanchez, PT Physical Therapist         Time Calculation:     Start Time: 1205  Stop Time: 1250  Time Calculation (min): 45 min     Therapy Charges for Today     Code Description Service Date Service Provider Modifiers Qty     [id number]  PT THER PROC EA 15 MIN 5/10/2019 Claude Elizabeth, PT GP 3          Claude Elizabeth, PT  5/10/2019

## 2019-05-13 ENCOUNTER — TELEPHONE (OUTPATIENT)
Dept: ORTHOPEDIC SURGERY | Facility: CLINIC | Age: 54
End: 2019-05-13

## 2019-05-13 ENCOUNTER — CLINICAL SUPPORT (OUTPATIENT)
Dept: ORTHOPEDIC SURGERY | Facility: CLINIC | Age: 54
End: 2019-05-13

## 2019-05-13 VITALS — HEIGHT: 72 IN | WEIGHT: 164 LBS | BODY MASS INDEX: 22.21 KG/M2 | TEMPERATURE: 98.6 F

## 2019-05-13 DIAGNOSIS — M17.11 PRIMARY LOCALIZED OSTEOARTHROSIS OF RIGHT LOWER LEG: Primary | ICD-10-CM

## 2019-05-13 PROCEDURE — 0232T NJX PLATELET PLASMA: CPT | Performed by: ORTHOPAEDIC SURGERY

## 2019-05-13 NOTE — PROGRESS NOTES
Patient: Magali Schrader  YOB: 1965  Date of Service: 2019    Chief Complaints:   Chief Complaint   Patient presents with   • Right Knee - Follow-up, Pain   Right knee pain    Subjective:    History of Present Illness: Pt is seen in the office today with complaints of Chief Complaint   Patient presents with   • Right Knee - Follow-up, Pain   .  Patient has known right knee degenerative changes she gets intermittent PRP injections and does very well she understands insurance does not cover them        Allergies: No Known Allergies    Medications:   Home Medications:  Current Outpatient Medications on File Prior to Visit   Medication Sig   • COLLAGEN PO Take 1 tablet by mouth Daily. Pt to stop 1 week before surgery   • Multiple Vitamin (MULTI-DAY VITAMINS) tablet Take 1 tablet by mouth Daily. Pt to stop 1 week before surgery     No current facility-administered medications on file prior to visit.      Current Medications:  Scheduled Meds:  Continuous Infusions:  No current facility-administered medications for this visit.   PRN Meds:.    I have reviewed the patient's medical history in detail and updated the computerized patient record.  Review and summarization of old records include:    Past Medical History:   Diagnosis Date   • Hyperlipidemia    • Sinus bradycardia    • Sinusitis     history   • Ventricular pre-excitation         Past Surgical History:   Procedure Laterality Date   •  SECTION  2003   • COLONOSCOPY N/A 2017    Procedure: COLONOSCOPY to cecum;  Surgeon: Ashanti Harley MD;  Location: [facility] ENDOSCOPY;  Service:    • KNEE ARTHROSCOPY Right 2017    Procedure: RT KNEE ARTHROSCOPY, PARTIAL MEDIAL MENISECTOMY, DEBRIDEMENT OF ARTHRITIS;  Surgeon: Talia Niño MD;  Location: [facility] OR [facility];  Service:    • TONSILLECTOMY          Social History     Occupational History   • Not on file   Tobacco Use   • Smoking status: Never Smoker   • Smokeless tobacco:  Never Used   Substance and Sexual Activity   • Alcohol use: Yes     Alcohol/week: 0.0 - 3.0 oz     Comment: Socially    • Drug use: No   • Sexual activity: Defer    Social History     Social History Narrative   • Not on file        Family History   Problem Relation Age of Onset   • Alzheimer's disease Mother    • Coronary artery disease Father         Father with CABG in early 70's    • Malig Hyperthermia Neg Hx        ROS: 14 point review of systems was performed and was negative except for documented findings in HPI and today's encounter.     Allergies: No Known Allergies  Constitutional:  Denies fever, shaking or chills   Eyes:  Denies change in visual acuity   HENT:  Denies nasal congestion or sore throat   Respiratory:  Denies cough or shortness of breath   Cardiovascular:  Denies chest pain or severe LE edema   GI:  Denies abdominal pain, nausea, vomiting, bloody stools or diarrhea   Musculoskeletal:  Numbness, tingling, or loss of motor function only as noted above in history of present illness.  : Denies painful urination or hematuria  Integument:  Denies rash, lesion or ulceration   Neurologic:  Denies headache or focal weakness  Endocrine:  Denies lymphadenopathy  Psych:  Denies confusion or change in mental status   Hem:  Denies active bleeding      Physical Exam: 54 y.o. female  Wt Readings from Last 3 Encounters:   05/13/19 74.4 kg (164 lb)   03/21/19 74.4 kg (164 lb)   03/15/19 74.6 kg (164 lb 6.4 oz)       Body mass index is 21.64 kg/m².  Facility age limit for growth percentiles is 20 years.  Vitals:    05/13/19 1251   Temp: 98.6 °F (37 °C)     Vital signs reviewed.   General Appearance:    Alert, cooperative, in no acute distress                  Eyes: conjunctiva clear  ENT: external ears and nose atraumatic  CV: no peripheral edema  Resp: normal respiratory effort  Skin: no rashes or wounds; normal turgor  Psych: mood and affect appropriate  Lymph: no nodes appreciated  Neuro: gross sensation  intact  Vascular:  Palpable peripheral pulse in noted extremity    Ortho exam    Physical exam of the right knee reveals no effusion, no erythema.  It mild loss of extension and full flexion  Patient has mild varus alignment.  They have mild tenderness to palpation about the medial compartment, no tenderness laterally..  The patient has a negative bounce home, negative Alexandra and a stable ligamentous exam.  Quad tone is reasonable and symmetric.  There are no overlying skin changes no lymphedema no lymphadenopathy.  There is good hip range of motion which is full symmetric and asymptomatic and a normal ankle exam.               Assessment: Right knee OA plan is to proceed with PRP injection    Plan: PRP injection Shira harvested the blood it was spun down and injected sterilely into the knee without difficulty  Follow up as indicated.  Ice, elevate, and rest as needed.  Talia Niño M.D.

## 2019-07-10 ENCOUNTER — DOCUMENTATION (OUTPATIENT)
Dept: PHYSICAL THERAPY | Facility: HOSPITAL | Age: 54
End: 2019-07-10

## 2019-07-10 NOTE — THERAPY DISCHARGE NOTE
Outpatient Physical Therapy Ortho Discharge       Patient Name: Magali Schrader  : 1965  MRN: [id number]  Today's Date: 7/10/2019      Visit Date: 07/10/2019    Patient Active Problem List   Diagnosis   • Hyperlipidemia   • Encounter for screening colonoscopy   • Closed nondisplaced fracture of lateral end of left clavicle        Past Medical History:   Diagnosis Date   • Hyperlipidemia    • Sinus bradycardia    • Sinusitis     history   • Ventricular pre-excitation         Past Surgical History:   Procedure Laterality Date   •  SECTION  2003   • COLONOSCOPY N/A 2017    Procedure: COLONOSCOPY to cecum;  Surgeon: Ashanti Harley MD;  Location: [facility] ENDOSCOPY;  Service:    • KNEE ARTHROSCOPY Right 2017    Procedure: RT KNEE ARTHROSCOPY, PARTIAL MEDIAL MENISECTOMY, DEBRIDEMENT OF ARTHRITIS;  Surgeon: Talia Niño MD;  Location: [facility] OR [facility];  Service:    • TONSILLECTOMY           Visit Dx:   No diagnosis found.      PT OP Goals     Row Name 07/10/19 1500          PT Short Term Goals    STG Date to Achieve  19  -MP     STG 1  Magali begins PREs for scapular retraction.  -MP     STG 1 Progress  Met  -MP     STG 2  She also is instructed in ER and IR walkouts with theraband.  -MP     STG 2 Progress  Met  -MP     STG 3  Shoulder wall slide and active elevation exercises are begun.  -MP     STG 3 Progress  Met  -MP        Long Term Goals    LTG Date to Achieve  19  -MP     LTG 1  Functional AROM of the L shoulder is equal to the R and pain free.  -MP     LTG 1 Progress  Met  -MP     LTG 2  MMT of the L shoulder, cardinal motions equals 4+ to 5/5.  -MP     LTG 2 Progress  Not Met  -MP     LTG 3  Pain, L shoulder, peaks at 1/10 and occurs infrequently.  -MP     LTG 3 Progress  Partially Met;Ongoing;Not Met  -MP     LTG 4  Magali is independent with a HEP and self care education.  -MP     LTG 4 Progress  Met  -MP       User Key  (r) = Recorded By, (t) = Taken By,  (c) = Cosigned By    Initials Name Provider Type    Claude Sanchez, PT Physical Therapist            OP PT Discharge Summary  Date of Discharge: 07/10/19  Reason for Discharge: Independent  Outcomes Achieved: Patient able to partially acheive established goals  Discharge Destination: Home with home program  Discharge Instructions/Additional Comments: Magali was reassessed on May 10, 2019, given a HEP to follow and advised to contact us if she had further need for our services.  She has not contacted us, therefore, I am assuming that she is doing well.        Claude Elizabeth, PT  7/10/2019

## 2019-08-26 ENCOUNTER — TELEPHONE (OUTPATIENT)
Dept: ORTHOPEDIC SURGERY | Facility: CLINIC | Age: 54
End: 2019-08-26

## 2019-08-26 ENCOUNTER — CLINICAL SUPPORT (OUTPATIENT)
Dept: ORTHOPEDIC SURGERY | Facility: CLINIC | Age: 54
End: 2019-08-26

## 2019-08-26 VITALS — BODY MASS INDEX: 20.59 KG/M2 | HEIGHT: 72 IN | WEIGHT: 152 LBS | TEMPERATURE: 98.6 F

## 2019-08-26 DIAGNOSIS — M17.11 PRIMARY LOCALIZED OSTEOARTHROSIS OF RIGHT LOWER LEG: Primary | ICD-10-CM

## 2019-08-26 PROCEDURE — 0232T NJX PLATELET PLASMA: CPT | Performed by: ORTHOPAEDIC SURGERY

## 2019-08-26 NOTE — PROGRESS NOTES
PRP Right knee Injection  Patient: Magali Schrader  YOB: 1965  Date of Service: 2019    Chief Complaints:   Chief Complaint   Patient presents with   • Right Knee - Pain, Follow-up   • Injections       Subjective:    History of Present Illness: Pt is seen in the office today with complaints of Chief Complaint   Patient presents with   • Right Knee - Pain, Follow-up   • Injections   .          Allergies: No Known Allergies    Medications:   Home Medications:  Current Outpatient Medications on File Prior to Visit   Medication Sig   • COLLAGEN PO Take 1 tablet by mouth Daily. Pt to stop 1 week before surgery   • Multiple Vitamin (MULTI-DAY VITAMINS) tablet Take 1 tablet by mouth Daily. Pt to stop 1 week before surgery     No current facility-administered medications on file prior to visit.      Current Medications:  Scheduled Meds:  Continuous Infusions:  No current facility-administered medications for this visit.   PRN Meds:.    I have reviewed the patient's medical history in detail and updated the computerized patient record.  Review and summarization of old records include:    Past Medical History:   Diagnosis Date   • Hyperlipidemia    • Sinus bradycardia    • Sinusitis     history   • Ventricular pre-excitation         Past Surgical History:   Procedure Laterality Date   •  SECTION  2003   • COLONOSCOPY N/A 2017    Procedure: COLONOSCOPY to cecum;  Surgeon: Ashanti Harley MD;  Location: [facility] ENDOSCOPY;  Service:    • KNEE ARTHROSCOPY Right 2017    Procedure: RT KNEE ARTHROSCOPY, PARTIAL MEDIAL MENISECTOMY, DEBRIDEMENT OF ARTHRITIS;  Surgeon: Talia Niño MD;  Location: [facility];  Service:    • TONSILLECTOMY          Social History     Occupational History   • Not on file   Tobacco Use   • Smoking status: Never Smoker   • Smokeless tobacco: Never Used   Substance and Sexual Activity   • Alcohol use: Yes     Alcohol/week: 0.0 - 3.0 oz     Comment:  Socially    • Drug use: No   • Sexual activity: Defer    Social History     Social History Narrative   • Not on file        Family History   Problem Relation Age of Onset   • Alzheimer's disease Mother    • Coronary artery disease Father         Father with CABG in early 70's    • Malig Hyperthermia Neg Hx        ROS: 14 point review of systems was performed and was negative except for documented findings in HPI and today's encounter.     Allergies: No Known Allergies  Constitutional:  Denies fever, shaking or chills   Eyes:  Denies change in visual acuity   HENT:  Denies nasal congestion or sore throat   Respiratory:  Denies cough or shortness of breath   Cardiovascular:  Denies chest pain or severe LE edema   GI:  Denies abdominal pain, nausea, vomiting, bloody stools or diarrhea   Musculoskeletal:  Numbness, tingling, or loss of motor function only as noted above in history of present illness.  : Denies painful urination or hematuria  Integument:  Denies rash, lesion or ulceration   Neurologic:  Denies headache or focal weakness  Endocrine:  Denies lymphadenopathy  Psych:  Denies confusion or change in mental status   Hem:  Denies active bleeding      Physical Exam: 54 y.o. female  Wt Readings from Last 3 Encounters:   08/26/19 68.9 kg (152 lb)   05/13/19 74.4 kg (164 lb)   03/21/19 74.4 kg (164 lb)       Body mass index is 20.05 kg/m².  Facility age limit for growth percentiles is 20 years.  Vitals:    08/26/19 1303   Temp: 98.6 °F (37 °C)     Vital signs reviewed.   General Appearance:    Alert, cooperative, in no acute distress                  Eyes: conjunctiva clear  ENT: external ears and nose atraumatic  CV: no peripheral edema  Resp: normal respiratory effort  Skin: no rashes or wounds; normal turgor  Psych: mood and affect appropriate  Lymph: no nodes appreciated  Neuro: gross sensation intact  Vascular:  Palpable peripheral pulse in noted extremity    Ortho exam    Exam is unchanged              Assessment: Right knee OA    Plan: PRP injection Shira harvested the blood I injected it sterilely into the knee she tolerated it well  Follow up as indicated.  Luke Niño M.D.

## 2019-08-27 ENCOUNTER — TELEPHONE (OUTPATIENT)
Dept: ORTHOPEDIC SURGERY | Facility: CLINIC | Age: 54
End: 2019-08-27

## 2019-10-21 ENCOUNTER — TELEPHONE (OUTPATIENT)
Dept: ORTHOPEDIC SURGERY | Facility: CLINIC | Age: 54
End: 2019-10-21

## 2019-10-21 NOTE — TELEPHONE ENCOUNTER
Regarding: Non-Urgent Medical Question  Contact: [phone]  ----- Message from [employer], Generic sent at 10/20/2019  3:03 PM EDT -----  MY CHART MESSAGE:    Dr Niño,    I am planning to start a flex spending account and want to add my PRP injections for my right knee.  At your convenience, I will need a LOMN to submit to my insurance company for approval.    Thanks for your time.    Magali Schrader

## 2019-11-26 ENCOUNTER — TELEPHONE (OUTPATIENT)
Dept: ORTHOPEDIC SURGERY | Facility: CLINIC | Age: 54
End: 2019-11-26

## 2019-11-26 ENCOUNTER — CLINICAL SUPPORT (OUTPATIENT)
Dept: ORTHOPEDIC SURGERY | Facility: CLINIC | Age: 54
End: 2019-11-26

## 2019-11-26 VITALS — BODY MASS INDEX: 20.99 KG/M2 | HEIGHT: 72 IN | WEIGHT: 155 LBS

## 2019-11-26 DIAGNOSIS — M17.11 PRIMARY LOCALIZED OSTEOARTHROSIS OF RIGHT LOWER LEG: ICD-10-CM

## 2019-11-26 DIAGNOSIS — M25.561 RIGHT KNEE PAIN, UNSPECIFIED CHRONICITY: Primary | ICD-10-CM

## 2019-11-26 PROCEDURE — 0232T NJX PLATELET PLASMA: CPT | Performed by: ORTHOPAEDIC SURGERY

## 2019-11-26 PROCEDURE — 73562 X-RAY EXAM OF KNEE 3: CPT | Performed by: ORTHOPAEDIC SURGERY

## 2019-11-26 NOTE — PROGRESS NOTES
PRP injection of right knee      Patient: Magali Schrader  YOB: 1965  Date of Service: 2019    Chief Complaints:   Chief Complaint   Patient presents with   • Right Knee - Follow-up, Pain       Subjective:    History of Present Illness: Pt is seen in the office today with complaints of Chief Complaint   Patient presents with   • Right Knee - Follow-up, Pain   .  Patient has some known degenerative changes right knee she gets intermittent PRP injections and does well      Allergies: No Known Allergies    Medications:   Home Medications:  Current Outpatient Medications on File Prior to Visit   Medication Sig   • COLLAGEN PO Take 1 tablet by mouth Daily. Pt to stop 1 week before surgery   • Multiple Vitamin (MULTI-DAY VITAMINS) tablet Take 1 tablet by mouth Daily. Pt to stop 1 week before surgery     No current facility-administered medications on file prior to visit.      Current Medications:  Scheduled Meds:  Continuous Infusions:  No current facility-administered medications for this visit.   PRN Meds:.    I have reviewed the patient's medical history in detail and updated the computerized patient record.  Review and summarization of old records include:    Past Medical History:   Diagnosis Date   • Hyperlipidemia    • Sinus bradycardia    • Sinusitis     history   • Ventricular pre-excitation         Past Surgical History:   Procedure Laterality Date   •  SECTION  2003   • COLONOSCOPY N/A 2017    Procedure: COLONOSCOPY to cecum;  Surgeon: Ashanti Harley MD;  Location: [facility] ENDOSCOPY;  Service:    • KNEE ARTHROSCOPY Right 2017    Procedure: RT KNEE ARTHROSCOPY, PARTIAL MEDIAL MENISECTOMY, DEBRIDEMENT OF ARTHRITIS;  Surgeon: Talia Niño MD;  [place] [facility] [place] [facility]  Service:    • TONSILLECTOMY          Social History     Occupational History   • Not on file   Tobacco Use   • Smoking status: Never Smoker   • Smokeless tobacco: Never Used   Substance and  Sexual Activity   • Alcohol use: Yes     Alcohol/week: 0.0 - 3.0 oz     Comment: Socially    • Drug use: No   • Sexual activity: Defer    Social History     Social History Narrative   • Not on file        Family History   Problem Relation Age of Onset   • Alzheimer's disease Mother    • Coronary artery disease Father         Father with CABG in early 70's    • Malig Hyperthermia Neg Hx        ROS: 14 point review of systems was performed and was negative except for documented findings in HPI and today's encounter.     Allergies: No Known Allergies  Constitutional:  Denies fever, shaking or chills   Eyes:  Denies change in visual acuity   HENT:  Denies nasal congestion or sore throat   Respiratory:  Denies cough or shortness of breath   Cardiovascular:  Denies chest pain or severe LE edema   GI:  Denies abdominal pain, nausea, vomiting, bloody stools or diarrhea   Musculoskeletal:  Numbness, tingling, or loss of motor function only as noted above in history of present illness.  : Denies painful urination or hematuria  Integument:  Denies rash, lesion or ulceration   Neurologic:  Denies headache or focal weakness  Endocrine:  Denies lymphadenopathy  Psych:  Denies confusion or change in mental status   Hem:  Denies active bleeding      Physical Exam: 54 y.o. female  Wt Readings from Last 3 Encounters:   11/26/19 70.3 kg (155 lb)   08/26/19 68.9 kg (152 lb)   05/13/19 74.4 kg (164 lb)       Body mass index is 20.45 kg/m².  Facility age limit for growth percentiles is 20 years.  There were no vitals filed for this visit.  Vital signs reviewed.   General Appearance:    Alert, cooperative, in no acute distress                  Eyes: conjunctiva clear  ENT: external ears and nose atraumatic  CV: no peripheral edema  Resp: normal respiratory effort  Skin: no rashes or wounds; normal turgor  Psych: mood and affect appropriate  Lymph: no nodes appreciated  Neuro: gross sensation intact  Vascular:  Palpable peripheral pulse  in noted extremity    Ortho exam    Exam is unchanged      X-rays AP lateral merchant view of the right knee were taken to evaluate her symptoms and compared to previous films she has very mild narrowing of her medial compartment as compared to the opposite      Assessment: Right knee pain with degenerative changes mostly seen at the time of surgery she is doing quite well with intermittent PRP injection she understands insurance does not cover this and she wishes to proceed    Plan: PRP Shira harvested the blood I then injected the platelets into the knee without difficulty this was done sterilely  Follow up as indicated.  Ice, elevate, and rest as needed.  Discussed conservative measures of pain control including ice, bracing.  Also talked about the importance of strengthening and maintaining ideal body weight    Talia Niño M.D.    Procedures

## 2020-03-02 ENCOUNTER — CLINICAL SUPPORT (OUTPATIENT)
Dept: ORTHOPEDIC SURGERY | Facility: CLINIC | Age: 55
End: 2020-03-02

## 2020-03-02 VITALS — HEIGHT: 72 IN | TEMPERATURE: 98.2 F | BODY MASS INDEX: 20.32 KG/M2 | WEIGHT: 150 LBS

## 2020-03-02 DIAGNOSIS — M17.11 PRIMARY LOCALIZED OSTEOARTHROSIS OF RIGHT LOWER LEG: Primary | ICD-10-CM

## 2020-03-02 PROCEDURE — 0232T NJX PLATELET PLASMA: CPT | Performed by: ORTHOPAEDIC SURGERY

## 2020-03-02 NOTE — PROGRESS NOTES
Patient: Magali Schrader  YOB: 1965  Date of Service: 3/2/2020    Chief Complaints: 6 right knee pain this patient gets intermittent injections    Subjective:    History of Present Illness: Pt is seen in the office today with complaints of right knee pain she gets intermittent injections of PRP she understands insurance does not cover them.          Allergies: No Known Allergies    Medications:   Home Medications:  Current Outpatient Medications on File Prior to Visit   Medication Sig   • COLLAGEN PO Take 1 tablet by mouth Daily. Pt to stop 1 week before surgery   • Multiple Vitamin (MULTI-DAY VITAMINS) tablet Take 1 tablet by mouth Daily. Pt to stop 1 week before surgery     No current facility-administered medications on file prior to visit.      Current Medications:  Scheduled Meds:  Continuous Infusions:  No current facility-administered medications for this visit.   PRN Meds:.    I have reviewed the patient's medical history in detail and updated the computerized patient record.  Review and summarization of old records include:    Past Medical History:   Diagnosis Date   • Hyperlipidemia    • Sinus bradycardia    • Sinusitis     history   • Ventricular pre-excitation         Past Surgical History:   Procedure Laterality Date   •  SECTION  2003   • COLONOSCOPY N/A 2017    Procedure: COLONOSCOPY to cecum;  Surgeon: Ashanti Harley MD;  Location: [facility] ENDOSCOPY;  Service:    • KNEE ARTHROSCOPY Right 2017    Procedure: RT KNEE ARTHROSCOPY, PARTIAL MEDIAL MENISECTOMY, DEBRIDEMENT OF ARTHRITIS;  Surgeon: Talia Niño MD;  Location: [facility] OR [facility];  Service:    • TONSILLECTOMY          Social History     Occupational History   • Not on file   Tobacco Use   • Smoking status: Never Smoker   • Smokeless tobacco: Never Used   Substance and Sexual Activity   • Alcohol use: Yes     Alcohol/week: 0.0 - 5.0 standard drinks     Comment: Socially    • Drug use: No   • Sexual  activity: Defer    Social History     Social History Narrative   • Not on file        Family History   Problem Relation Age of Onset   • Alzheimer's disease Mother    • Coronary artery disease Father         Father with CABG in early 70's    • Malig Hyperthermia Neg Hx        ROS: 14 point review of systems was performed and was negative except for documented findings in HPI and today's encounter.     Allergies: No Known Allergies  Constitutional:  Denies fever, shaking or chills   Eyes:  Denies change in visual acuity   HENT:  Denies nasal congestion or sore throat   Respiratory:  Denies cough or shortness of breath   Cardiovascular:  Denies chest pain or severe LE edema   GI:  Denies abdominal pain, nausea, vomiting, bloody stools or diarrhea   Musculoskeletal:  Numbness, tingling, or loss of motor function only as noted above in history of present illness.  : Denies painful urination or hematuria  Integument:  Denies rash, lesion or ulceration   Neurologic:  Denies headache or focal weakness  Endocrine:  Denies lymphadenopathy  Psych:  Denies confusion or change in mental status   Hem:  Denies active bleeding      Physical Exam: 55 y.o. female  Wt Readings from Last 3 Encounters:   11/26/19 70.3 kg (155 lb)   08/26/19 68.9 kg (152 lb)   05/13/19 74.4 kg (164 lb)       There is no height or weight on file to calculate BMI.  No height and weight on file for this encounter.  There were no vitals filed for this visit.  Vital signs reviewed.   General Appearance:    Alert, cooperative, in no acute distress                  Eyes: conjunctiva clear  ENT: external ears and nose atraumatic  CV: no peripheral edema  Resp: normal respiratory effort  Skin: no rashes or wounds; normal turgor  Psych: mood and affect appropriate  Lymph: no nodes appreciated  Neuro: gross sensation intact  Vascular:  Palpable peripheral pulse in noted extremity    Ortho exam    Physical exam of the right knee reveals no effusion, no erythema.   It mild loss of extension and full flexion  Patient has mild varus alignment.  They have mild tenderness to palpation about the medial compartment, no tenderness laterally..  The patient has a negative bounce home, negative Alexandra and a stable ligamentous exam.  Quad tone is reasonable and symmetric.  There are no overlying skin changes no lymphedema no lymphadenopathy.  There is good hip range of motion which is full symmetric and asymptomatic and a normal ankle exam.                Assessment: Right knee OA plan is proceed with PRP Shira harvested the blood we spun it down injected it sterilely without difficulty    Plan:   Follow up as indicated.  Ice, elevate, and rest as needed.  Discussed conservative measures of pain control including ice, bracing.  Also talked about the importance of strengthening and maintaining ideal body weight    Talia Niño M.D.

## 2020-03-02 NOTE — PROGRESS NOTES
"Right Knee Follow Up      Patient: Magali Schrader        YOB: 1965            Chief Complaints:right  knee pain      History of Present Illness: Patient is here follow-up of right knee pain she has known degenerative changes she gets intermittent PRP injections she has had several before they are working well for her she understands insurance does not cover them      Physical Exam: 55 y.o. female  General Appearance:    Alert, cooperative, in no acute distress                   Vitals:    03/02/20 1413   Temp: 98.2 °F (36.8 °C)   Weight: 68 kg (150 lb)   Height: 185.4 cm (73\")   PainSc:   2        Patient is alert and read ×3 no acute distress appears her above-listed at height weight and age.  Affect is normal respiratory rate is normal unlabored. Heart rate regular rate rhythm, sclera, dentition and hearing are normal for the purpose of this exam.      Ortho Exam     Physical exam of the right knee reveals no effusion, no erythema.  It mild loss of extension and full flexion  Patient has mild varus alignment.  They have mild tenderness to palpation about the medial compartment, no tenderness laterally..  The patient has a negative bounce home, negative Alexandra and a stable ligamentous exam.  Quad tone is reasonable and symmetric.  There are no overlying skin changes no lymphedema no lymphadenopathy.  There is good hip range of motion which is full symmetric and asymptomatic and a normal ankle exam.              Assessment/Plan: Right knee pain with degenerative changes plan is to proceed with PRP.  And that did harvest the blood injected it sterilely without difficulty          "

## 2020-05-26 ENCOUNTER — TELEPHONE (OUTPATIENT)
Dept: ORTHOPEDIC SURGERY | Facility: CLINIC | Age: 55
End: 2020-05-26

## 2020-06-16 ENCOUNTER — OFFICE VISIT (OUTPATIENT)
Dept: ORTHOPEDIC SURGERY | Facility: CLINIC | Age: 55
End: 2020-06-16

## 2020-06-16 VITALS — BODY MASS INDEX: 20.59 KG/M2 | WEIGHT: 152 LBS | TEMPERATURE: 98.6 F | HEIGHT: 72 IN

## 2020-06-16 DIAGNOSIS — M17.11 PRIMARY LOCALIZED OSTEOARTHROSIS OF RIGHT LOWER LEG: Primary | ICD-10-CM

## 2020-06-16 PROCEDURE — 0232T NJX PLATELET PLASMA: CPT | Performed by: ORTHOPAEDIC SURGERY

## 2020-06-16 PROCEDURE — 73562 X-RAY EXAM OF KNEE 3: CPT | Performed by: ORTHOPAEDIC SURGERY

## 2020-06-16 RX ADMIN — METHYLPREDNISOLONE ACETATE 80 MG: 80 INJECTION, SUSPENSION INTRA-ARTICULAR; INTRALESIONAL; INTRAMUSCULAR; SOFT TISSUE at 08:33

## 2020-06-16 NOTE — PROGRESS NOTES
Patient: Magali Schrader  YOB: 1965  Date of Service: 2020    Chief Complaints: Right knee pain    Subjective:    History of Present Illness: Pt is seen in the office today with complaints of right knee pain she does get intermittent PRP injections and she is done well states her knee feels great she think she found the best recipe for her knee.          Allergies: No Known Allergies    Medications:   Home Medications:  Current Outpatient Medications on File Prior to Visit   Medication Sig   • COLLAGEN PO Take 1 tablet by mouth Daily. Pt to stop 1 week before surgery   • Multiple Vitamin (MULTI-DAY VITAMINS) tablet Take 1 tablet by mouth Daily. Pt to stop 1 week before surgery     No current facility-administered medications on file prior to visit.      Current Medications:  Scheduled Meds:  Continuous Infusions:  No current facility-administered medications for this visit.   PRN Meds:.    I have reviewed the patient's medical history in detail and updated the computerized patient record.  Review and summarization of old records include:    Past Medical History:   Diagnosis Date   • Hyperlipidemia    • Sinus bradycardia    • Sinusitis     history   • Ventricular pre-excitation         Past Surgical History:   Procedure Laterality Date   •  SECTION  2003   • COLONOSCOPY N/A 2017    Procedure: COLONOSCOPY to cecum;  Surgeon: Asahnti Harley MD;  Location: [facility] ENDOSCOPY;  Service:    • KNEE ARTHROSCOPY Right 2017    Procedure: RT KNEE ARTHROSCOPY, PARTIAL MEDIAL MENISECTOMY, DEBRIDEMENT OF ARTHRITIS;  Surgeon: Talia Niño MD;  Location: [facility] OR [facility];  Service:    • TONSILLECTOMY          Social History     Occupational History   • Not on file   Tobacco Use   • Smoking status: Never Smoker   • Smokeless tobacco: Never Used   Substance and Sexual Activity   • Alcohol use: Yes     Alcohol/week: 0.0 - 5.0 standard drinks     Comment: Socially    • Drug use: No   •  Sexual activity: Defer    Social History     Social History Narrative   • Not on file        Family History   Problem Relation Age of Onset   • Alzheimer's disease Mother    • Coronary artery disease Father         Father with CABG in early 70's    • Malig Hyperthermia Neg Hx        ROS: 14 point review of systems was performed and was negative except for documented findings in HPI and today's encounter.     Allergies: No Known Allergies  Constitutional:  Denies fever, shaking or chills   Eyes:  Denies change in visual acuity   HENT:  Denies nasal congestion or sore throat   Respiratory:  Denies cough or shortness of breath   Cardiovascular:  Denies chest pain or severe LE edema   GI:  Denies abdominal pain, nausea, vomiting, bloody stools or diarrhea   Musculoskeletal:  Numbness, tingling, or loss of motor function only as noted above in history of present illness.  : Denies painful urination or hematuria  Integument:  Denies rash, lesion or ulceration   Neurologic:  Denies headache or focal weakness  Endocrine:  Denies lymphadenopathy  Psych:  Denies confusion or change in mental status   Hem:  Denies active bleeding      Physical Exam: 55 y.o. female  Wt Readings from Last 3 Encounters:   03/02/20 68 kg (150 lb)   11/26/19 70.3 kg (155 lb)   08/26/19 68.9 kg (152 lb)       There is no height or weight on file to calculate BMI.  No height and weight on file for this encounter.  There were no vitals filed for this visit.  Vital signs reviewed.   General Appearance:    Alert, cooperative, in no acute distress                  Eyes: conjunctiva clear  ENT: external ears and nose atraumatic  CV: no peripheral edema  Resp: normal respiratory effort  Skin: no rashes or wounds; normal turgor  Psych: mood and affect appropriate  Lymph: no nodes appreciated  Neuro: gross sensation intact  Vascular:  Palpable peripheral pulse in noted extremity    Ortho exam    Physical exam of the right knee reveals no effusion, no  erythema.  It mild loss of extension and full flexion  Patient has mild varus alignment.  They have mild tenderness to palpation about the medial compartment, no tenderness laterally..  The patient has a negative bounce home, negative Alexandra and a stable ligamentous exam.  Quad tone is reasonable and symmetric.  There are no overlying skin changes no lymphedema no lymphadenopathy.  There is good hip range of motion which is full symmetric and asymptomatic and a normal ankle exam.      X-rays AP lateral merchant view the right knee were taken to evaluate her knee and compared to previous films she does have some very mild narrowing of her medial compartment on the right with some osteophyte formation         Assessment: Right knee OA plan is to proceed with PRP injection she understands that insurance does not cover this    Plan:   Follow up as indicated.  Ice, elevate, and rest as needed.  Discussed conservative measures of pain control including ice, bracing.  Also talked about the importance of strengthening     Talia Niño M.D.    Large Joint Arthrocentesis: R knee  Date/Time: 6/16/2020 8:33 AM  Consent given by: patient  Site marked: site marked  Timeout: Immediately prior to procedure a time out was called to verify the correct patient, procedure, equipment, support staff and site/side marked as required   Supporting Documentation  Indications: pain   Procedure Details  Location: knee - R knee  Needle gauge: 21.  Approach: anteromedial  Medications administered: 4 mL lidocaine (cardiac); 80 mg methylPREDNISolone acetate 80 MG/ML  Patient tolerance: patient tolerated the procedure well with no immediate complications

## 2020-06-17 RX ORDER — METHYLPREDNISOLONE ACETATE 80 MG/ML
80 INJECTION, SUSPENSION INTRA-ARTICULAR; INTRALESIONAL; INTRAMUSCULAR; SOFT TISSUE
Status: COMPLETED | OUTPATIENT
Start: 2020-06-16 | End: 2020-06-16

## 2020-09-08 ENCOUNTER — TELEPHONE (OUTPATIENT)
Dept: ORTHOPEDIC SURGERY | Facility: CLINIC | Age: 55
End: 2020-09-08

## 2020-09-29 ENCOUNTER — CLINICAL SUPPORT (OUTPATIENT)
Dept: ORTHOPEDIC SURGERY | Facility: CLINIC | Age: 55
End: 2020-09-29

## 2020-09-29 VITALS — HEIGHT: 72 IN | BODY MASS INDEX: 20.99 KG/M2 | WEIGHT: 155 LBS | TEMPERATURE: 98.6 F

## 2020-09-29 DIAGNOSIS — M17.11 PRIMARY LOCALIZED OSTEOARTHROSIS OF RIGHT LOWER LEG: Primary | ICD-10-CM

## 2020-09-29 PROCEDURE — 0232T NJX PLATELET PLASMA: CPT | Performed by: ORTHOPAEDIC SURGERY

## 2020-09-29 NOTE — PROGRESS NOTES
Knee PRP injection Follow Up      Patient: Magali Schrader        YOB: 1965            Chief Complaints: knee pain right      History of Present Illness: Patient is here follow right knee pain she gets intermittent PRP injections she understands insurance does not cover them she is getting great relief from them wants to continue      Physical Exam: 55 y.o. female  General Appearance:    Alert, cooperative, in no acute distress                 There were no vitals filed for this visit.     Patient is alert and read ×3 no acute distress appears her above-listed at height weight and age.  Affect is normal respiratory rate is normal unlabored. Heart rate regular rate rhythm, sclera, dentition and hearing are normal for the purpose of this exam.      Ortho Exam     Physical exam of the right knee reveals no effusion, no erythema.  It mild loss of extension and full flexion  Patient has mild varus alignment.  They have mild tenderness to palpation about the medial compartment, no tenderness laterally..  The patient has a negative bounce home, negative Alexandra and a stable ligamentous exam.  Quad tone is reasonable and symmetric.  There are no overlying skin changes no lymphedema no lymphadenopathy.  There is good hip range of motion which is full symmetric and asymptomatic and a normal ankle exam.              Assessment/Plan:      Right knee OA plan is to proceed with PRP injection this was injected sterilely.  Shira did harvest the blood and spun this down we injected it without difficulty

## 2020-11-25 ENCOUNTER — TELEPHONE (OUTPATIENT)
Dept: INTERNAL MEDICINE | Facility: CLINIC | Age: 55
End: 2020-11-25

## 2020-11-25 NOTE — TELEPHONE ENCOUNTER
PATIENTS  IS CALLING IN HE STATES THAT PATIENT IS RUNNING A TEMP 100.5 AND THEY ARE NOT SURE WHAT THEY SHOULD DO.    SHOULD THEY GO GET TESTED FOR COVID OR WHAT AND SHE HAS NO OTHER SYMPTOMS.      WOULD LIKE A CALLBACK TO GET SOME INSIGHT ON WHAT THEY SHOULD DO.      CALLBACK NUMBER IS:  [phone]

## 2020-12-11 ENCOUNTER — OFFICE VISIT (OUTPATIENT)
Dept: CARDIOLOGY | Facility: CLINIC | Age: 55
End: 2020-12-11

## 2020-12-11 VITALS
SYSTOLIC BLOOD PRESSURE: 121 MMHG | HEART RATE: 61 BPM | WEIGHT: 165 LBS | DIASTOLIC BLOOD PRESSURE: 74 MMHG | BODY MASS INDEX: 21.77 KG/M2

## 2020-12-11 DIAGNOSIS — R00.2 PALPITATIONS: Primary | ICD-10-CM

## 2020-12-11 PROCEDURE — 99443 PR PHYS/QHP TELEPHONE EVALUATION 21-30 MIN: CPT | Performed by: NURSE PRACTITIONER

## 2020-12-11 NOTE — PROGRESS NOTES
[facility] CARDIOLOGY  [address]  [address]  [facility] [address]  Phone: [phone]      Patient Name: Magali Schrader  :1965  Age: 55 y.o.  Primary Cardiologist: Roc Mcfarlane MD  Encounter Provider:  MELINDA Ceron      Chief Complaint:   Chief Complaint   Patient presents with   • Rapid Heart Rate     GM pt / Restoration[facility]  f/u          HPI  Magali Schrader is a 55 y.o. female who presents today via telephone visit secondary to COVID pandemic. Patient presents today for evaluation after urgent care visit. Patient is new to me but I have reviewed her prior medical records.     Pt has a  history significant for WPW, heart palpitations.    Patient reports that she was diagnosed with COVID .  She states that since that time she has self quarantined.  She has been able to continue exercising since that time at least an hour per day.  She notes that starting on  she began having problems with elevated HR in the 170s.  She has had a few episodes of elevated HR since the evaluation at  on 2020.  I have reviewed the  note, but there is not an official interpretation.  The ECG imaging is not scanned.  Patient states that she has experienced episodes of lightheadedness with the elevated HR episodes.  She denies shortness of breath, chest discomfort.  She states that she has had difficulty getting a deep breath with the elevation of HR.  She states that the episodes of elevated HR has occurred more often after hard workouts, she states that she typically does triathlons. She admits that she does not sleep well and often takes shots of tequila before going to bed.         The following portions of the patient's history were reviewed and updated as appropriate: allergies, current medications, past family history, past medical history, past social history, past surgical history and problem list.      Review of Systems   Constitution: Negative for  malaise/fatigue.   Cardiovascular: Positive for palpitations. Negative for chest pain and leg swelling.   Respiratory: Negative for shortness of breath.    Neurological: Negative for light-headedness.   All other systems reviewed and are negative.      OBJECTIVE:     Vitals:    12/11/20 1031   BP: 121/74   Pulse: 61   Weight: 74.8 kg (165 lb)     Body mass index is 21.77 kg/m².    Physical Exam  Physical exam not performed secondary to telephone visit.    Procedures    Cardiac Procedures:  1. 24-hour Holter 3/21/2019.  Relatively benign monitor study.  2. Echocardiogram 3/21/2019.  LVEF 61 to 65%.  Normal RV cavity size and systolic function.  Calculated RVSP 21 mmHg.    ASSESSMENT:      Diagnosis Plan   1. Palpitations  Holter Monitor - 72 Hour Up To 21 Days         PLAN OF CARE:     1. Palpitations.  As noted above patient is having episodes of heart palpitations with tachycardia mainly after strenuous workouts.  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  Patient states that she was diagnosed with COVID-19 on 11/25 but reports that her symptoms have been very mild and she has been able to continue her high level of exercise.  She denies any shortness of breath or chest discomfort.  Patient reports that her heart rate elevates as high as the 170s after exercise.  She reports that it never reaches this high during exercise.  She states that the longest episode has lasted 10-12 minutes.  She states that she is a little bit concerned about this as she does have a history of WPW on ECGs in the past.  I reviewed her records from urgent care on 12/7, however there was no official interpretation in the note and the ECG tracings are not scanned into the medical record system.  Patient's episodes do not occur daily and I do not feel that a 24-hour Holter will capture these episodes.  I recommended that we place a 2-week ZIO monitor to evaluate for possible dysrhythmias.  Patient would like this placed  today if possible.  2. Follow-up to be arranged after monitor results.    This patient has consented to a telehealth visit via telephone. I spent 23 minutes in total including but not limited to the direct conversation with this patient. All vitals recorded within this visit are reported by the patient.         Discharge Medications          Accurate as of December 11, 2020 11:07 AM. If you have any questions, ask your nurse or doctor.            Continue These Medications      Instructions Start Date   COLLAGEN PO   1 tablet, Oral, Daily, Pt to stop 1 week before surgery       Multi-Day Vitamins tablet tablet  Generic drug: multivitamin   1 tablet, Oral, Daily, Pt to stop 1 week before surgery             Thank you for allowing me to participate in the care of your patient,         MELINDA Ceron  [facility]  12/11/20  10:48 EST      **Qing Disclaimer:**  Much of this encounter note is an electronic transcription/translation of spoken language to printed text. The electronic translation of spoken language may permit erroneous, or at times, nonsensical words or phrases to be inadvertently transcribed. Although I have reviewed the note for such errors, some may still exist.

## 2021-01-08 DIAGNOSIS — I47.1 PAROXYSMAL SVT (SUPRAVENTRICULAR TACHYCARDIA) (HCC): Primary | ICD-10-CM

## 2021-01-08 RX ORDER — ATENOLOL 25 MG/1
25 TABLET ORAL DAILY
Qty: 30 TABLET | Refills: 11 | Status: SHIPPED | OUTPATIENT
Start: 2021-01-08 | End: 2021-05-21

## 2021-01-12 ENCOUNTER — TELEPHONE (OUTPATIENT)
Dept: INTERNAL MEDICINE | Facility: CLINIC | Age: 56
End: 2021-01-12

## 2021-01-12 NOTE — TELEPHONE ENCOUNTER
"----- Message from Afua Ye sent at 1/12/2021  1:59 PM EST -----  Regarding: FW: Prescription Question  Contact: [phone]    ----- Message -----  From: Magali Schrader  Sent: 1/12/2021   1:36 PM EST  To: Yeison De La [facility]  Subject: Prescription Question                            Venu Cerda,     I have been prescribed the beta blocker Atenolol. I wore a Zio monitor for two weeks and Dr Hernandez prescribed the drug after the result etc. I believe you would have access to my recent history that brought me to this point. I have not taken any of the medication as of yet. I have been referred to a electrophysiologist for consult. Google suggests \"ablation\".     Where do I go from here?    Thanks,    Magali Schrader   [employer]     "

## 2021-01-12 NOTE — TELEPHONE ENCOUNTER
Discussed need to not fly until her symptoms are resolved with ablation and she has cardiac clearance.  May start the atenolol now since she is not going to fly for likely several months.  Will call them with blood pressure and pulse especially if she has symptoms.

## 2021-01-13 ENCOUNTER — TELEPHONE (OUTPATIENT)
Dept: ORTHOPEDIC SURGERY | Facility: CLINIC | Age: 56
End: 2021-01-13

## 2021-01-13 NOTE — TELEPHONE ENCOUNTER
Caller: TAMI    Relationship to patient: SELF    Best call back number: [phone]    PT NEEDS TO RESCHED INJ ON 1/26 WITH WILLIS WHITAKER.    Type of visit: INJ        If rescheduling, when is the original appointment: 1/26    Additional notes:PT STATES SHE NEEDS APPT FOR PRP INJ. APPT NEEDS TO BE ON MON OR Tuesday. PT WOULD LIKE TO MOVE IT UP A WEEK. 1/18, 1/19, OR 1/25.

## 2021-01-14 NOTE — TELEPHONE ENCOUNTER
Caller: TAMI    Relationship to patient: SELF    Best call back number: [phone]        Type of visit: PRP INJ        Additional notes:PT WANTS AN UPDATE ABOUT GETTING RESCHED FOR HER INJ FOR NEXT WEEK INSTEAD OF THE WEEK AFTER.

## 2021-02-05 ENCOUNTER — OFFICE VISIT (OUTPATIENT)
Dept: CARDIOLOGY | Facility: CLINIC | Age: 56
End: 2021-02-05

## 2021-02-05 VITALS
DIASTOLIC BLOOD PRESSURE: 78 MMHG | HEIGHT: 72 IN | SYSTOLIC BLOOD PRESSURE: 120 MMHG | BODY MASS INDEX: 20.99 KG/M2 | HEART RATE: 59 BPM | WEIGHT: 155 LBS

## 2021-02-05 DIAGNOSIS — I47.1 PSVT (PAROXYSMAL SUPRAVENTRICULAR TACHYCARDIA) (HCC): ICD-10-CM

## 2021-02-05 DIAGNOSIS — I45.6 WPW (WOLFF-PARKINSON-WHITE SYNDROME): Primary | ICD-10-CM

## 2021-02-05 PROBLEM — I47.10 PSVT (PAROXYSMAL SUPRAVENTRICULAR TACHYCARDIA): Status: ACTIVE | Noted: 2021-02-05

## 2021-02-05 PROCEDURE — 99215 OFFICE O/P EST HI 40 MIN: CPT | Performed by: INTERNAL MEDICINE

## 2021-02-05 PROCEDURE — 93000 ELECTROCARDIOGRAM COMPLETE: CPT | Performed by: INTERNAL MEDICINE

## 2021-02-05 NOTE — PROGRESS NOTES
Date of Office Visit: 2021  Encounter Provider: Alejandro Pitts MD  Place of Service: [facility] CARDIOLOGY  Patient Name: Magali Schrader  : 1965    Subjective:     Encounter Date:2021      Patient ID: Magali Schrader is a 56 y.o. female who has a cc of referred by BREA b/c of tachycardia that occurred post ride.     She had a watch and it recorded 170.     She is a [employer] .     In 2019 she had abnormal ECG during flight physical -- WPW>     That has been asymptomatic -- nothing.     All this stuff started post COVID     Zio actually showed 90 secs of AF < 1% burden    Short runs of SVT -- lasting at most 32 seconds.     She feels great now. No problems. [place] and [place].     Now the palp are gone but during early exercise she has high heart rates in the beginning of exercise 150, then she stops for a second and it goes back to normal.     She has done less well on BB>      The patient had a good year.   No anginal chest pain,   No sig tran,   No soa,   No fainting,  No orthostasis.   No edema.   Exercise tolerance: she has amazing fitness     There have been no hospital admission since the last visit.     There have been no bleeding events.       Past Medical History:   Diagnosis Date   • Hyperlipidemia    • Sinus bradycardia    • Sinusitis     history   • Ventricular pre-excitation        Social History     Socioeconomic History   • Marital status:      Spouse name: Not on file   • Number of children: Not on file   • Years of education: Not on file   • Highest education level: Not on file   Tobacco Use   • Smoking status: Never Smoker   • Smokeless tobacco: Never Used   Substance and Sexual Activity   • Alcohol use: Yes     Alcohol/week: 0.0 - 5.0 standard drinks     Comment: Socially (pt reports she has recently been drinking more than she should)   • Drug use: No   • Sexual activity: Defer       Review of Systems  "  Constitution: Negative for fever and night sweats.   HENT: Negative for ear pain and stridor.    Eyes: Negative for discharge and visual halos.   Cardiovascular: Negative for cyanosis.   Respiratory: Negative for hemoptysis and sputum production.    Hematologic/Lymphatic: Negative for adenopathy.   Skin: Negative for nail changes and unusual hair distribution.   Musculoskeletal: Negative for gout and joint swelling.   Gastrointestinal: Negative for bowel incontinence and flatus.   Genitourinary: Negative for dysuria and flank pain.   Neurological: Negative for seizures and tremors.   Psychiatric/Behavioral: Negative for altered mental status. The patient is not nervous/anxious.             Objective:     Vitals:    02/05/21 1001   BP: 120/78   Pulse: 59   Weight: 70.3 kg (155 lb)   Height: 185.4 cm (73\")         Eyes:      General:         Right eye: No discharge.         Left eye: No discharge.   HENT:      Head: Normocephalic and atraumatic.   Neck:      Thyroid: No thyromegaly.      Vascular: No JVD.   Pulmonary:      Effort: Pulmonary effort is normal.      Breath sounds: Normal breath sounds. No rales.   Cardiovascular:      Normal rate. Regular rhythm.      No gallop.   Edema:     Peripheral edema absent.   Abdominal:      General: Bowel sounds are normal.      Palpations: Abdomen is soft.      Tenderness: There is no abdominal tenderness.   Musculoskeletal: Normal range of motion.         General: No deformity.   Skin:     General: Skin is warm and dry.      Findings: No erythema.   Neurological:      Mental Status: Alert and oriented to person, place, and time.      Motor: Normal muscle tone.   Psychiatric:         Behavior: Behavior normal.         Thought Content: Thought content normal.           ECG 12 Lead    Date/Time: 2/5/2021 10:37 AM  Performed by: Alejandro Pitts MD  Authorized by: Alejandro Pitts MD   Comparison: compared with previous ECG   Similar to previous ECG  Rhythm: sinus rhythm  Rate: " normal  Conduction: conduction normal  ST Segments: ST segments normal  T Waves: T waves normal  QRS axis: normal    Clinical impression: normal ECG            Lab Review:       Assessment:          Diagnosis Plan   1. WPW (Vel-Parkinson-White syndrome)     2. PSVT (paroxysmal supraventricular tachycardia) (CMS/HCC)            Plan:         She has some mildly asymptomatic (palp) atrial arrhythmia including AT, AF and Aflutter -- all of which last seconds to no more than 2 minutes and never assoc with high rates and less than 1 burden    She has only noticed this post COVID --     She is longtime endurance athlete and has a structurally normal heart and great exercise capacity     Due to the benign and short duration nature I would not entertain any anticoagulation or ablation.     I would not see how this could affect her flying --     I think she can go back to training slowly and gradually and we can stop the betablocker now.    This hopefully will simmer down with time.     The 2019 ECG showed WPW but she's never had symptoms and today's ECG is normal and this means the WPW is not hazardous.

## 2021-02-18 ENCOUNTER — TELEPHONE (OUTPATIENT)
Dept: ORTHOPEDICS | Facility: OTHER | Age: 56
End: 2021-02-18

## 2021-02-18 NOTE — TELEPHONE ENCOUNTER
Spoke with pt and she was informed that she will need to contact the billing office and she was given their phone number.

## 2021-03-26 ENCOUNTER — BULK ORDERING (OUTPATIENT)
Dept: CASE MANAGEMENT | Facility: OTHER | Age: 56
End: 2021-03-26

## 2021-03-26 DIAGNOSIS — Z23 IMMUNIZATION DUE: ICD-10-CM

## 2021-05-21 ENCOUNTER — OFFICE VISIT (OUTPATIENT)
Dept: ORTHOPEDIC SURGERY | Facility: CLINIC | Age: 56
End: 2021-05-21

## 2021-05-21 VITALS — TEMPERATURE: 96.4 F | HEIGHT: 72 IN | WEIGHT: 157 LBS | BODY MASS INDEX: 21.26 KG/M2

## 2021-05-21 DIAGNOSIS — S83.282A OTHER TEAR OF LATERAL MENISCUS OF LEFT KNEE AS CURRENT INJURY, INITIAL ENCOUNTER: ICD-10-CM

## 2021-05-21 DIAGNOSIS — M25.562 LEFT KNEE PAIN, UNSPECIFIED CHRONICITY: Primary | ICD-10-CM

## 2021-05-21 DIAGNOSIS — S83.242A OTHER TEAR OF MEDIAL MENISCUS OF LEFT KNEE AS CURRENT INJURY, INITIAL ENCOUNTER: ICD-10-CM

## 2021-05-21 PROCEDURE — 99213 OFFICE O/P EST LOW 20 MIN: CPT | Performed by: ORTHOPAEDIC SURGERY

## 2021-05-25 ENCOUNTER — OFFICE VISIT (OUTPATIENT)
Dept: CARDIOLOGY | Facility: CLINIC | Age: 56
End: 2021-05-25

## 2021-05-25 VITALS
WEIGHT: 154.6 LBS | BODY MASS INDEX: 20.94 KG/M2 | SYSTOLIC BLOOD PRESSURE: 100 MMHG | HEART RATE: 53 BPM | DIASTOLIC BLOOD PRESSURE: 70 MMHG | HEIGHT: 72 IN

## 2021-05-25 DIAGNOSIS — I47.1 PSVT (PAROXYSMAL SUPRAVENTRICULAR TACHYCARDIA) (HCC): Primary | ICD-10-CM

## 2021-05-25 DIAGNOSIS — I45.6 WPW (WOLFF-PARKINSON-WHITE SYNDROME): ICD-10-CM

## 2021-05-25 PROCEDURE — 99213 OFFICE O/P EST LOW 20 MIN: CPT | Performed by: NURSE PRACTITIONER

## 2021-05-25 PROCEDURE — 93000 ELECTROCARDIOGRAM COMPLETE: CPT | Performed by: NURSE PRACTITIONER

## 2021-05-25 NOTE — PROGRESS NOTES
Date of Office Visit: 2021  Encounter Provider: MELINDA Siegel  Place of Service: [facility]ARDI[facility]  Patient Name: Magali Schrader  :1965    Chief Complaint   Patient presents with   • Vel-Parkinson-White Syndrome   • PSVT   :     HPI: Magali Schrader is a 56 y.o. female who was referred to Dr. Pitts for tachycardia. She saw him in Feb. She had been put on BB, the palpitations had resolved but was noting high HR's in the beginning of exercise then back to normal.     Zio showed 90 sec of AF <1% burden and short runs of SVT, most about 30 seconds. He stopped BB and she presents today for follow up.     The episodes have decreased---she has had about 4 since she saw him in Feb but they seem more intense---lasting 5-7 minutes. She has noticed that a couple of them correlate with having a few drinks the night prior.     She is a [employer]  and she does not have a normal schedule---she gets home at around 2 am and she has a hard time settling down and going to sleep---so sometimes she has a couple of drinks---she realized the possible correlation and has not done that for a week now--so far no episodes, although the last episode she had did not correlate to alcohol.     She has resumed exercising and her episodes do seem to occur with or after exercise.     When not having an episode she feels good---no chest pain, dyspnea, PND, orthopnea or edema.     With the most recent episode she did have some dizziness.        Past Medical History:   Diagnosis Date   • Hyperlipidemia    • PSVT (paroxysmal supraventricular tachycardia) (CMS/HCC)    • Sinus bradycardia    • Sinusitis     history   • Ventricular pre-excitation    • WPW (Vel-Parkinson-White syndrome)        Past Surgical History:   Procedure Laterality Date   •  SECTION  2003   • COLONOSCOPY N/A 2017    Procedure: COLONOSCOPY to cecum;  Surgeon: Ashanti Harley MD;  Location: [facility]  ENDOSCOPY;  Service:    • KNEE ARTHROSCOPY Right 5/16/2017    Procedure: RT KNEE ARTHROSCOPY, PARTIAL MEDIAL MENISECTOMY, DEBRIDEMENT OF ARTHRITIS;  Surgeon: Talia Niño MD;  Location: [facility] OR [facility] [place];  Service:    • TONSILLECTOMY         Social History     Socioeconomic History   • Marital status:      Spouse name: Not on file   • Number of children: Not on file   • Years of education: Not on file   • Highest education level: Not on file   Tobacco Use   • Smoking status: Never Smoker   • Smokeless tobacco: Never Used   Vaping Use   • Vaping Use: Never used   Substance and Sexual Activity   • Alcohol use: Yes     Alcohol/week: 0.0 - 5.0 standard drinks     Comment: Socially (pt reports she has recently been drinking more than she should)   • Drug use: No   • Sexual activity: Defer       Family History   Problem Relation Age of Onset   • Alzheimer's disease Mother    • Coronary artery disease Father         Father with CABG in early 70's    • Malig Hyperthermia Neg Hx        Review of Systems   Constitutional: Negative for chills, fever and malaise/fatigue.   Cardiovascular: Positive for irregular heartbeat and palpitations. Negative for chest pain, dyspnea on exertion, leg swelling, near-syncope, orthopnea, paroxysmal nocturnal dyspnea and syncope.   Respiratory: Negative for cough and shortness of breath.    Musculoskeletal: Negative for joint pain, joint swelling and myalgias.   Gastrointestinal: Negative for abdominal pain, diarrhea, melena, nausea and vomiting.   Genitourinary: Negative for frequency and hematuria.   Neurological: Positive for dizziness. Negative for light-headedness, numbness, paresthesias and seizures.   Allergic/Immunologic: Negative.    All other systems reviewed and are negative.      No Known Allergies      Current Outpatient Medications:   •  COLLAGEN PO, Take 1 tablet by mouth Daily. Pt to stop 1 week before surgery, Disp: , Rfl:   •  Multiple Vitamin (MULTI-DAY VITAMINS)  "tablet, Take 1 tablet by mouth Daily. Pt to stop 1 week before surgery, Disp: , Rfl:       Objective:     Vitals:    05/25/21 1100   BP: 100/70   Pulse: 53   Weight: 70.1 kg (154 lb 9.6 oz)   Height: 185.4 cm (73\")     Body mass index is 20.4 kg/m².    PHYSICAL EXAM:    Vitals Reviewed.   General Appearance: No acute distress, well developed and well nourished.   Eyes: Conjunctiva and lids: No erythema, swelling, or discharge. Sclera non-icteric.   HENT: Atraumatic, normocephalic. External eyes, ears, and nose normal.   Respiratory: No signs of respiratory distress. Respiration rhythm and depth normal.   Clear to auscultation. No rales, crackles, rhonchi, or wheezing auscultated.   Cardiovascular:  Jugular Venous Pressure: Normal  Heart Rate and Rhythm: Normal, Heart Sounds: Normal S1 and S2. No S3 or S4 noted.  Murmurs: No murmurs noted. No rubs, thrills, or gallops.   Arterial Pulses:  Posterior tibialis and dorsalis pedis pulses normal.   Lower Extremities: No edema noted.  Gastrointestinal:  Abdomen soft, non-distended, non-tender.   Musculoskeletal: Normal movement of extremities  Skin and Nails: General appearance normal. No pallor, cyanosis, diaphoresis. Skin temperature normal. No clubbing of fingernails.   Psychiatric: Patient alert and oriented to person, place, and time. Speech and behavior appropriate. Normal mood and affect.       ECG 12 Lead    Date/Time: 5/25/2021 11:22 AM  Performed by: Jessenia Silva APRN  Authorized by: Jessenia Silva APRN   Comparison: compared with previous ECG   Similar to previous ECG  Rhythm: sinus bradycardia  BPM: 45  Other findings: delta wave  Comments: She has some intermittent beats with WPW/delta wave noted.               Assessment:       Diagnosis Plan   1. PSVT (paroxysmal supraventricular tachycardia) (CMS/HCC)     2. WPW (Vel-Parkinson-White syndrome)            Plan:       Her episodes have decreased but are more \"intense.\" They last about 5-7 minutes. There " "may be a correlation to her having a couple of drinks the evening prior as well as \"bad sleep.\" She is working on better sleep and has abstained from Etoh for now.     She is going to call me with an update in a couple of weeks. Will discuss with Dr. Pitts when he returns to the office. We did discuss taking PRN beta blocker for any prolonged episodes.     Will have her follow up with Dr. Pitts in 3 months or sooner if needed.     As always, it has been a pleasure to participate in your patient's care.      Sincerely,         MELINDA Gonzalez  "

## 2021-08-16 ENCOUNTER — OFFICE VISIT (OUTPATIENT)
Dept: CARDIOLOGY | Facility: CLINIC | Age: 56
End: 2021-08-16

## 2021-08-16 VITALS
DIASTOLIC BLOOD PRESSURE: 70 MMHG | HEART RATE: 46 BPM | SYSTOLIC BLOOD PRESSURE: 108 MMHG | BODY MASS INDEX: 21.4 KG/M2 | HEIGHT: 72 IN | WEIGHT: 158 LBS

## 2021-08-16 DIAGNOSIS — R00.2 PALPITATIONS: ICD-10-CM

## 2021-08-16 DIAGNOSIS — I45.6 WPW (WOLFF-PARKINSON-WHITE SYNDROME): Primary | ICD-10-CM

## 2021-08-16 DIAGNOSIS — I47.1 PSVT (PAROXYSMAL SUPRAVENTRICULAR TACHYCARDIA) (HCC): ICD-10-CM

## 2021-08-16 PROCEDURE — 93000 ELECTROCARDIOGRAM COMPLETE: CPT | Performed by: INTERNAL MEDICINE

## 2021-08-16 PROCEDURE — 99214 OFFICE O/P EST MOD 30 MIN: CPT | Performed by: INTERNAL MEDICINE

## 2021-08-16 NOTE — PROGRESS NOTES
Date of Office Visit: 2021  Encounter Provider: Alejandro Pitts MD  Place of Service: [facility] CARDIOLOG[facility]  Patient Name: Magali Schrader  : 1965    Subjective:     Encounter Date:2021      Patient ID: Magali Schrader is a 56 y.o. female who has a cc of  Endurance athlete and  and she has a post-septal AT that has intermittent pre-excitation.    A zio showed short runs of SVT -- likely AVRT but also a short run of AF.     Always after exercise.     Longest episode every was 20 minutes.     No anginal chest pain,   No sig tran,   No soa,   No fainting,  No orthostasis.   No edema.   Exercise tolerance: good. 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     She is a retired .     There have been no hospital admission since the last visit.     There have been no bleeding events.       Past Medical History:   Diagnosis Date   • Hyperlipidemia    • PSVT (paroxysmal supraventricular tachycardia) (CMS/HCC)    • Sinus bradycardia    • Sinusitis     history   • Ventricular pre-excitation    • WPW (Vel-Parkinson-White syndrome)        Social History     Socioeconomic History   • Marital status:      Spouse name: Not on file   • Number of children: Not on file   • Years of education: Not on file   • Highest education level: Not on file   Tobacco Use   • Smoking status: Never Smoker   • Smokeless tobacco: Never Used   Vaping Use   • Vaping Use: Never used   Substance and Sexual Activity   • Alcohol use: Yes     Alcohol/week: 0.0 - 5.0 standard drinks     Comment: Socially (pt reports she has recently been drinking more than she should)   • Drug use: No   • Sexual activity: Defer       Family History   Problem Relation Age of Onset   • Alzheimer's disease Mother    • Coronary artery disease Father         Father with CABG in early 70's    • Malig Hyperthermia Neg Hx        Review of Systems   Constitutional: Negative for fever and night  "sweats.   HENT: Negative for ear pain and stridor.    Eyes: Negative for discharge and visual halos.   Cardiovascular: Negative for cyanosis.   Respiratory: Negative for hemoptysis and sputum production.    Hematologic/Lymphatic: Negative for adenopathy.   Skin: Negative for nail changes and unusual hair distribution.   Musculoskeletal: Negative for gout and joint swelling.   Gastrointestinal: Negative for bowel incontinence and flatus.   Genitourinary: Negative for dysuria and flank pain.   Neurological: Negative for seizures and tremors.   Psychiatric/Behavioral: Negative for altered mental status. The patient is not nervous/anxious.             Objective:     Vitals:    08/16/21 0906   BP: 108/70   Pulse: (!) 46   Weight: 71.7 kg (158 lb)   Height: 185.4 cm (73\")         Eyes:      General:         Right eye: No discharge.         Left eye: No discharge.   HENT:      Head: Normocephalic and atraumatic.   Neck:      Thyroid: No thyromegaly.      Vascular: No JVD.   Pulmonary:      Effort: Pulmonary effort is normal.      Breath sounds: Normal breath sounds. No rales.   Cardiovascular:      Normal rate. Regular rhythm.      No gallop.   Edema:     Peripheral edema absent.   Abdominal:      General: Bowel sounds are normal.      Palpations: Abdomen is soft.      Tenderness: There is no abdominal tenderness.   Musculoskeletal: Normal range of motion.         General: No deformity. Skin:     General: Skin is warm and dry.      Findings: No erythema.   Neurological:      Mental Status: Alert and oriented to person, place, and time.      Motor: Normal muscle tone.   Psychiatric:         Behavior: Behavior normal.         Thought Content: Thought content normal.           ECG 12 Lead    Date/Time: 8/16/2021 9:42 AM  Performed by: Alejandro Pitts MD  Authorized by: Alejandro Pitts MD   Comparison: compared with previous ECG   Similar to previous ECG  Rhythm: sinus rhythm  Rate: normal  Conduction: conduction normal  ST " Segments: ST segments normal  T Waves: T waves normal  QRS axis: normal    Clinical impression: normal ECG            Lab Review:       Assessment:          Diagnosis Plan   1. WPW (Vel-Parkinson-White syndrome)     2. PSVT (paroxysmal supraventricular tachycardia) (CMS/HCC)     3. Palpitations            Plan:     She has WPW but the antegrade conduction is gone -- so the risk of serious complications is also gone. She does have periods of AVRT likely using the AP as the retrograde limb.     Her heart is normal by echo and she has excellent exercise capacity so these things confer no significant worry or risk.     If the symptoms get to the point where they bother her enough I can an ablation. I think it is possible that the pathway will peter out over time. But it might not.    On the zio -- she had 93 seconds of AF. So I would ignore this is as well.     Fu in one year or sooner.

## 2021-11-15 ENCOUNTER — TELEPHONE (OUTPATIENT)
Dept: ORTHOPEDIC SURGERY | Facility: CLINIC | Age: 56
End: 2021-11-15

## 2021-11-15 NOTE — TELEPHONE ENCOUNTER
UNABLE TO WARM TRANSFER    Caller: Magali Schrader    Relationship to patient: Self    Best call back number:[phone]    Patient is needing: CALLING TO SCHEDULE PRP INJECTION.

## (undated) DEVICE — SUT ETHLN 3/0 PS1 18IN 1663H

## (undated) DEVICE — TUBING, SUCTION, 1/4" X 10', STRAIGHT: Brand: MEDLINE

## (undated) DEVICE — BNDG ELAS CO-FLEX SLF ADHR 4IN5YD LF STRL

## (undated) DEVICE — TBG 02 CRUSH RESIST LF CLR 7FT

## (undated) DEVICE — TBG ARTHSCP PT W CONN/REDUC 8FT

## (undated) DEVICE — PK ARTHSCP 40

## (undated) DEVICE — DRSNG SURESITE WNDW 2.38X2.75

## (undated) DEVICE — UNDERCAST PADDING: Brand: DEROYAL

## (undated) DEVICE — ABL APOLLO RF M/PRT 90D

## (undated) DEVICE — BLD DISSCT COOL CUT SJ CRVD 4MM 13CM

## (undated) DEVICE — THE TORRENT IRRIGATION SCOPE CONNECTOR IS USED WITH THE TORRENT IRRIGATION TUBING TO PROVIDE IRRIGATION FLUIDS SUCH AS STERILE WATER DURING GASTROINTESTINAL ENDOSCOPIC PROCEDURES WHEN USED IN CONJUNCTION WITH AN IRRIGATION PUMP (OR ELECTROSURGICAL UNIT).: Brand: TORRENT

## (undated) DEVICE — SKIN PREP TRAY W/CHG: Brand: MEDLINE INDUSTRIES, INC.

## (undated) DEVICE — CANN NASL CO2 TRULINK W/O2 A/

## (undated) DEVICE — BNDG ELAS ELITE V/CLOSE 4IN 5YD LF STRL

## (undated) DEVICE — DRSNG WND GZ CURAD OIL EMULSION 3X3IN STRL

## (undated) DEVICE — Device: Brand: DEFENDO AIR/WATER/SUCTION AND BIOPSY VALVE

## (undated) DEVICE — GLV SURG BIOGEL LTX PF 6 1/2

## (undated) DEVICE — GOWN,SIRUS,NON REINFRCD,LARGE,SET IN SL: Brand: MEDLINE

## (undated) DEVICE — DISPOSABLE TOURNIQUET CUFF SINGLE BLADDER, SINGLE PORT AND QUICK CONNECT CONNECTOR: Brand: COLOR CUFF

## (undated) DEVICE — GLV SURG SENSICARE GREEN W/ALOE PF LF 6.5 STRL